# Patient Record
Sex: FEMALE | Race: WHITE | NOT HISPANIC OR LATINO | Employment: OTHER | URBAN - METROPOLITAN AREA
[De-identification: names, ages, dates, MRNs, and addresses within clinical notes are randomized per-mention and may not be internally consistent; named-entity substitution may affect disease eponyms.]

---

## 2024-03-11 DIAGNOSIS — Z96.641 AFTERCARE FOLLOWING RIGHT HIP JOINT REPLACEMENT SURGERY: ICD-10-CM

## 2024-03-11 DIAGNOSIS — Z47.1 AFTERCARE FOLLOWING RIGHT HIP JOINT REPLACEMENT SURGERY: ICD-10-CM

## 2024-03-11 RX ORDER — CHLORHEXIDINE GLUCONATE ORAL RINSE 1.2 MG/ML
15 SOLUTION DENTAL AS NEEDED
Qty: 120 ML | Refills: 0 | Status: SHIPPED | OUTPATIENT
Start: 2024-03-11 | End: 2024-03-25

## 2024-03-13 DIAGNOSIS — M16.11 PRIMARY OSTEOARTHRITIS OF RIGHT HIP: ICD-10-CM

## 2024-04-03 RX ORDER — HYDROCODONE BITARTRATE AND ACETAMINOPHEN 5; 325 MG/1; MG/1
1 TABLET ORAL 2 TIMES DAILY PRN
COMMUNITY
End: 2024-04-18

## 2024-04-03 RX ORDER — LEVOTHYROXINE SODIUM 200 UG/1
1 TABLET ORAL
COMMUNITY

## 2024-04-03 RX ORDER — NAPROXEN 250 MG/1
250 TABLET ORAL
COMMUNITY
End: 2024-04-18

## 2024-04-03 RX ORDER — VIT C/E/ZN/COPPR/LUTEIN/ZEAXAN 250MG-90MG
2 CAPSULE ORAL DAILY
COMMUNITY

## 2024-04-03 RX ORDER — BISMUTH SUBSALICYLATE 262 MG
1 TABLET,CHEWABLE ORAL DAILY
COMMUNITY

## 2024-04-03 RX ORDER — LOSARTAN POTASSIUM 50 MG/1
1 TABLET ORAL DAILY
COMMUNITY

## 2024-04-04 ENCOUNTER — TELEMEDICINE CLINICAL SUPPORT (OUTPATIENT)
Dept: PREADMISSION TESTING | Facility: HOSPITAL | Age: 69
End: 2024-04-04
Payer: COMMERCIAL

## 2024-04-04 DIAGNOSIS — M16.11 PRIMARY OSTEOARTHRITIS OF RIGHT HIP: ICD-10-CM

## 2024-04-16 NOTE — DISCHARGE INSTRUCTIONS
Jair Dill MD MS  [address] [facility]   [address]  [phone] (office)  [phone] (fax)    PLEASE READ CAREFULLY BEFORE CONTACTING YOUR PROVIDER.    WE WORK COLLABORATIVELY AS A TEAM. CALLING MULTIPLE STAFF MEMBERS REGARDING THE SAME ISSUE WILL DELAY YOUR CARE.  [facility] [employer] IS THE PREFERRED COMMUNICATION FOR ALL TEAM MEMBERS.    Postoperative Instructions: TOTAL HIP ARTHROPLASTY    JOINT CARE TEAM  Please use the information below to contact your care team following surgery.  If you are leaving a message, please include your full name, date of birth and date of surgery so that we can correctly identify you.  Your call will be returned within 1-2 business days, please do not leave multiple messages regarding a single issue while you are awaiting a return call.     Who to call Contact Information Matters needing handled   Jair Dill MD [facility]  [phone] Prescription Refills   Orders for dental antibiotics lifelong     Marla Ivey MBA, BSN, RN-BC  PREETHI Elizabeth, RN  Ortho Program Navigators - PREETHI Zapien, RN  Ortho Coordinator Wilian ALANIZN-BC  Ortho Nurse Navigator   [phone] Nursing, medical question related questions or concerns within 6 weeks of surgery   Orders for Outpatient Physical Therapy                    [phone]     Scheduling office Visits  Medical questions/concerns  Leave of Absence or other paperwork  Any concerns more than 6 weeks from surgery - an appointment will need to be made     MEDICATION REFILLS - (MyChart or Main Office)    You will not receive a call indicating that your prescription has been filled.  Please contact your pharmacy with any questions.    Medication refills will be filled Monday-Friday 7am to 1pm ONLY. Please call the office or send a [facility] message for a refill request.  Any requests received outside of this timeframe will be handled on the next  business day.  The office staff and orthopedic nurses cannot refill medications; messages should be left directly through the office or via my chart.  Please do not call multiple times or call other members of the care team for medication needs, this will cause the refill to take longer.    Per State and Institutional policy, pain medications can only be refilled every 7 days for up to six weeks following surgery.    DRIVING & TRAVEL AFTER SURGERY   Patients should anticipate waiting at least 4-6 weeks before traveling long distances after surgery.  You will need to stop to walk around ever 1 hour during your travel to help with blood clot prevention.  Please call the office or your joint nurse to discuss prior to post-surgical travel.  Patients may not drive until cleared by the joint nurse or the office.    DENTAL PROCEDURES & CLEANINGS  You must wait a minimum of 3 months for elective dental appointments, including routine cleanings or dental work including bridges, crowns, extractions, etc..  For any dental visit - cleaning or dental procedures - patients must take an antibiotic 1 hour before the appointment.  Antibiotics are a lifelong need before dental appointments.  The antibiotic prescribed will be based on each patient's allergies.    WOUND CARE  Pain and swelling are normal following surgery and can last for weeks to months depending on the patient.  To help relieve these symptoms, please follow the post-operative pain regimen as it has been prescribed, use ice often, wear compression stockings every day as prescribed, and elevate your leg every hour.   Leaving the hospital, you have an ACE wrap in place. Two days after surgery, you can remove the ACE wrap and discard it. It does NOT need to be reapplied again.  You have a waterproof bandage on your wound and may shower with this on. The waterproof bandage is to remain in place for a 7 days. You or your home therapist should remove it at this time. You  may leave your incision open to air after the bandage has been removed.  You may shower 48 hours after surgery.  Do not scrub directly over or near the edges of your surgical bandage.  Soap and water may run freely over the site.  Under your waterproof bandage you have Steri-strips or a mesh covering in place. DO NOT peel them off. They will fall off on their own. You can continue to shower with these on. Let the water run freely over your incision when showering and do not scrub the incision or the surrounding area.   When drying the area around the incision, please use a SEPARATE towel that was not used on the rest of your body. The towel should be recently laundered but does not have to be cleaned a specific way. It should be laundered every 3 days. Pat the area dry. Do not rub the area around the incision. Steri strips will fall off in 1-2 weeks. If after 2 weeks they, have not, gently peel them off.  You may have clear stitches at the ends of your wound. Place Band-Aids on them for the first few weeks to prevent rubbing. You can gently tug on them after 2 weeks and they will come out or fall out on their own. DO NOT cut them. If they have not fallen out by you post-op visit, your doctor will remove them  If you have black stitches in place, your doctor will remove them in 2 weeks. These CANNOT get wet. If you have the silver waterproof bandage in place, you can shower. If the waterproof bandage is removed or not sticking, you CANNOT shower.  DO NOT soak your incision in a bath, hot tub, pool or pond/lake for a minimum of 12 weeks following your surgery.  DO NOT use lotions, creams, ointments on your wound for a minimum of 6 weeks following your surgery. At that time you may use vitamin E to assist with softening of your incision. Your physical therapist or doctor will talk to you about scar massage which can be started at 6 weeks.   You do NOT need to use the soap that was provided to you prior to surgery after  surgery. Use regular soap or shampoo.     PAIN, SWELLING, BRUISING & CLICKING  Pain and swelling are a natural part of your recovery which is considered normal fur up to a year after surgery.  Symptoms may be treated with movement, ice, compression stockings, elevating your leg, and by following the pain medication regimen as prescribed.  Bruising is normal for several weeks after surgery and will run down the leg over time.  You may ice areas that are tender to help with discomfort.  You are required to wear the provided compression stockings, every day, for 4 weeks following surgery.  Remove the stockings at night and place them back on in the morning.  Pain and swelling may temporarily increase with an increase in activity or exercise.  Use ice after activity.  Audible clicking with movement or exercises is considered normal following joint replacement.  You may also feel decreased sensation or numbness near the incision site.  These are usually normal and may or may not fade over time.     PERSONAL HYGIENE  You may shower upon discharging from the hospital.  Soap and water is permitted to run over the surgical dressing, steri-strips and incision.  Do not scrub directly over these items.  DO NOT soak your incision in a bath, hot tub, pool or pond/lake for a minimum of 12 weeks following your surgery.  DO NOT use lotions, creams, ointments on your wound for a minimum of 6 weeks following your surgery. At that time you may use vitamin E to assist with softening of your incision.      RESTARTING HOME ROUTINE - DIET & MEDICATIONS  Post-operative constipation can result due to a combination of inactivity, anesthesia and pain medication. To help prevent this, you should increase your water and fiber intake. Physical activity such as walking will also help stimulate the bowels.   You may resume your normal diet when you discharge home.  Choose foods that help promote good bowel habits and prevent constipation, such as  foods high in fiber.  You may restart your home medications the following day after your surgery UNLESS you have been given alternate instructions.  Follow the instructions given to you on your hospital discharge instructions for more information regarding your home medications.      IN-HOME PHYSICAL THERAPY & OUTPATIENT PHYSICAL THERAPY  Remember to get up and walk around your home every hour to 90 minutes to prevent blood clots and help with your recovery. This is an ACTIVE recovery.  In-home physical therapy will start 1-2 days after you get home from the hospital.    The home care agency will call within the first 24-48 hours to set up their first visit.  Please do not call your care team to inquire during this timeframe.  Continue the exercises you were given in the hospital until you have been seen by in-home therapy.  Make sure to provide a phone number with the ability for the home care staff to leave a message if you do not answer your phone.    Outpatient physical therapy following hip replacement surgery should begin 2-3 weeks after surgery if you and your physical therapist feel that you need it.  You should call to schedule this appointment ASAP if not already scheduled before surgery.  Waiting until you are ready for outpatient physical therapy will cause a delay in your care.  You may choose any outpatient physical therapy location.  Call the office for an order if needed.    EMERGENCIES - WHEN TO CONTACT THE SURGEON'S OFFICE IMMEDIATELY  Fever >101 with chills that has been present for at least 48 hours.   Excessive bleeding from incision that will not slow down. A small amount of drainage is normal and expected.  Once pressure is applied and the area is covered, do not continue to check the area regularly.  This will remove pressure and bleeding will continue.  Leave in place for 4-6 hours.  Signs of infection of incision-excessive drainage that is soaking through your dressing (especially if it is  pus-like), redness that is spreading out from the edges of your incision, or increased warmth around the area.  Excruciating pain for which the pain medication, taken as instructed, is not helping.  Severe calf pain.  Go directly to the emergency room or call 911, if you are experiencing chest pain or difficulty breathing.    ICE & COLD THERAPY INSTRUCTIONS    To assist with pain control and post-op swelling, you should be using ice regularly throughout recovery, especially for the first 6 weeks, regardless of the cold therapy method you use.      Always make sure there is a layer of protection between the cold pad and your skin.    If you are using ICE PACKS or GEL PACKS, you will need to alternate 20 minutes on, 20 minutes off twice per hour.    If you are using an ICE MACHINE, please follow the provided ice machine instructions.  These devices differ from ice or ice packs whereas the mechanism circulates water through tubing and a pad to provide longer periods of cold therapy to the desired site.  You can use your cold devices around the clock for optimal comfort.  We recommend using cold therapy after working with therapy or completing exercises on your own.  There is no set schedule in which you must follow while using cold therapy.  Below are a few points to remember when using a cold therapy device:    You do not need to need to use the 20 on, 20 off method.  Detach the pad from the cooler and ambulate at least once every hour.  You can check your skin under the pad at this time.  You may wear the cold therapy device during periods of sleep including overnight.  If you wake up during the night, you can check the skin at this time.  You do not need to wake up specifically to perform skin checks.  Empty the cooler and pad when device is not in use.  Follow 's instructions for cleaning your cold therapy device.      DISCHARGE MEDICATIONS - Please reference the sample schedule for instructions on how  to best schedule medications.  PAIN MEDICATION    ___X_ Tramadol / Oxycodone  Tramadol and Oxycodone have been prescribed for post-operative pain control.    These medications will only be refilled ONCE every 7 days for a period of up to 6 weeks following surgery.  After 6 weeks, you will transition to acetaminophen and over -the- counter anti-inflammatories such as Ibuprofen, Advil or Aleve in conjunction with ICE/COLD THERAPY.   Side effects may be constipation and nausea, vomiting, sleepiness, dizziness, lightheadedness, headache, blurred vision, dry mouth sweating, itching (if you have itching, over-the -counter Benadryl can be used as needed).  You may NOT operate a motor vehicle while taking these medications or have been cleared by your care team.     ___X_ Acetaminophen (Tylenol)  Acetaminophen has been prescribed as an adjunct for pain control. Take two 500 mg tablets every 8 hours for 4 weeks. You will not receive a refill on this medication.  Do not exceed 4000mg of acetaminophen within a 24 hour period.  Side effects may include nausea, heartburn, drowsiness, and headache.    _______ Meloxicam (Mobic)-Meloxicam has been prescribed as an adjunct anti-inflammatory to assist in pain control.    Take one 15mg tablet once daily for 4 weeks.  You will not receive refills on this medication.   Side effects may include nausea.  May not be prescribed if you are on a more potent blood thinner than aspirin or have chronic kidney disease.    BLOOD THINNER    ___X_ Blood Thinner   Aspirin, eliquis or xarelto has been prescribed as a blood thinner to prevent blood clots in your leg or lungs. Take as prescribed on the bottle for 4 weeks. You will not receive a refill on this medication.  Do not take this medication if you are on another blood thinner.  Do not take any additional anti-inflammatory medications while taking Aspirin or another blood thinning medication.  Examples may include, Celebrex, Ibuprofen, Motrin,  Aleve, or Advil.     ANTI NAUSEA    ___X_ Pantoprazole (Protonix)  Pantoprazole has been prescribed to help with nausea and protect your stomach while taking pain medication. Take one 40 mg tablet once daily for 4 weeks. You will not receive a refill on this medication.    ___X_ Zofran (Ondansetron)  Zofran has been prescribed to help with nausea and protect your stomach while taking pain medication. Take one 4 mg tablet every eight hours as needed for nausea. Refills will be provided as necessary.      STOOL SOFTENERS    ___X_ Colace (Docusate Sodium) and Senna (Sennoside)  Post-operative constipation can result due to a combination of inactivity, anesthesia and pain medication. To help prevent this, you should increase your water and fiber intake. Physical activity such as walking will also help stimulate the bowels.   Colace has been prescribed to help with constipation while on Oxycodone and Tramadol. Take one 100 mg tablet twice daily for 4 weeks. No refills needed.  Senna has been prescribed in combination with Colace to help with constipation while taking your pain medications.  Take one 8.6mg Tablet once daily.      ANTIBIOTICS    ___X_ Cefadroxil or Doxycycline   Post-operative prevention of infection   Side effects can be upset stomach or diarrhea  Take with food. Do not take on an empty stomach  May not be prescribed     You will not receive refills on the following medications:  Acetaminophen (Tylenol)  Senna  Colace  Pantoprazole  Blood Thinner (Aspirin or Eliquis)  Antibiotic (Cefadroxil or Doxycycline)  Pain Medication Refills -[phone] or Annemarie- Monday through Friday 7am-1pm    Medication refills will be sent upon receipt of your request during the times listed above. Due to the high call volume, you will not receive a call confirming prescription refills; please do not call multiple times.  Prescription refills may take a few hours to process, you may follow up with your pharmacy for pickup  availability.    SAMPLE              The times below are an example of how to organize medications to optimize pain control  Your actual medication schedule may vary based on your last dose taken IN THE HOSPITAL      Time 3:00am 6:00am 9:00am 12:00pm 3:00pm 6:00pm 9:00pm 12:00am   Medications Tramadol Acetaminophen (Tylenol)   Oxycodone  Senna   Blood Thinner  Colace  Pantoprazole  Tramadol  Antibiotic  Oxycodone Tramadol  Acetaminophen (Tylenol) Oxycodone     Blood Thinner  Colace  Tramadol  Antibiotic   Acetaminophen (Tylenol)   Oxycodone            You may begin to wean off the pain medication as your pain remains controlled with increased activity.  The schedules provided are meant to serve as an example.  You may wean off based on your pain control.  Please note that pain medications are not filled beyond 6 weeks after surgery.              The times below are an example of how to WEAN OFF medications WHILE CONTINUING TO OPTIMIZE PAIN CONTROL.  Your actual medication schedule may vary based on your last dose taken.  Time 12:00am 4:00am 8:00am 12:00pm 4:00pm 8:00pm   Med Tramadol Oxycodone   Tramadol Oxycodone Tramadol Oxycodone     Time 12:00am 6:00am 12:00pm 6:00pm   Med Tramadol Oxycodone   Tramadol Oxycodone     Time 12:00am 8:00am 4:00pm   Med Tramadol Oxycodone   Tramadol     Time 12:00am 12:00pm   Med Tramadol Tramadol

## 2024-04-17 ENCOUNTER — HOSPITAL ENCOUNTER (OUTPATIENT)
Dept: RADIOLOGY | Facility: HOSPITAL | Age: 69
Discharge: HOME | End: 2024-04-17
Payer: COMMERCIAL

## 2024-04-17 ENCOUNTER — PRE-ADMISSION TESTING (OUTPATIENT)
Dept: PREADMISSION TESTING | Facility: HOSPITAL | Age: 69
End: 2024-04-17
Payer: COMMERCIAL

## 2024-04-17 ENCOUNTER — OFFICE VISIT (OUTPATIENT)
Dept: ORTHOPEDIC SURGERY | Facility: CLINIC | Age: 69
End: 2024-04-17
Payer: COMMERCIAL

## 2024-04-17 VITALS
HEIGHT: 66 IN | OXYGEN SATURATION: 96 % | SYSTOLIC BLOOD PRESSURE: 116 MMHG | DIASTOLIC BLOOD PRESSURE: 63 MMHG | TEMPERATURE: 97.3 F | BODY MASS INDEX: 36.85 KG/M2 | RESPIRATION RATE: 18 BRPM | HEART RATE: 89 BPM | WEIGHT: 229.28 LBS

## 2024-04-17 DIAGNOSIS — R52 PAIN: Primary | ICD-10-CM

## 2024-04-17 DIAGNOSIS — M16.11 PRIMARY OSTEOARTHRITIS OF RIGHT HIP: Primary | ICD-10-CM

## 2024-04-17 DIAGNOSIS — R52 PAIN: ICD-10-CM

## 2024-04-17 PROCEDURE — 73502 X-RAY EXAM HIP UNI 2-3 VIEWS: CPT | Mod: RIGHT SIDE | Performed by: RADIOLOGY

## 2024-04-17 PROCEDURE — 99204 OFFICE O/P NEW MOD 45 MIN: CPT | Performed by: NURSE PRACTITIONER

## 2024-04-17 PROCEDURE — 1036F TOBACCO NON-USER: CPT | Performed by: STUDENT IN AN ORGANIZED HEALTH CARE EDUCATION/TRAINING PROGRAM

## 2024-04-17 PROCEDURE — 99214 OFFICE O/P EST MOD 30 MIN: CPT | Performed by: STUDENT IN AN ORGANIZED HEALTH CARE EDUCATION/TRAINING PROGRAM

## 2024-04-17 PROCEDURE — 73502 X-RAY EXAM HIP UNI 2-3 VIEWS: CPT | Mod: RT

## 2024-04-17 PROCEDURE — 1159F MED LIST DOCD IN RCRD: CPT | Performed by: STUDENT IN AN ORGANIZED HEALTH CARE EDUCATION/TRAINING PROGRAM

## 2024-04-17 PROCEDURE — 1160F RVW MEDS BY RX/DR IN RCRD: CPT | Performed by: STUDENT IN AN ORGANIZED HEALTH CARE EDUCATION/TRAINING PROGRAM

## 2024-04-17 RX ORDER — CEFADROXIL 500 MG/1
500 CAPSULE ORAL 2 TIMES DAILY
Qty: 10 CAPSULE | Refills: 0 | Status: SHIPPED | OUTPATIENT
Start: 2024-04-17 | End: 2024-04-24

## 2024-04-17 RX ORDER — ONDANSETRON 4 MG/1
4 TABLET, FILM COATED ORAL EVERY 8 HOURS PRN
Qty: 20 TABLET | Refills: 0 | Status: SHIPPED | OUTPATIENT
Start: 2024-04-17

## 2024-04-17 RX ORDER — TRAMADOL HYDROCHLORIDE 50 MG/1
50-100 TABLET ORAL EVERY 6 HOURS PRN
Qty: 40 TABLET | Refills: 0 | Status: SHIPPED | OUTPATIENT
Start: 2024-04-17 | End: 2024-04-22 | Stop reason: SDUPTHER

## 2024-04-17 RX ORDER — PANTOPRAZOLE SODIUM 40 MG/1
40 TABLET, DELAYED RELEASE ORAL
Qty: 30 TABLET | Refills: 0 | Status: SHIPPED | OUTPATIENT
Start: 2024-04-17 | End: 2024-05-19

## 2024-04-17 RX ORDER — OXYCODONE HYDROCHLORIDE 5 MG/1
5-10 TABLET ORAL EVERY 6 HOURS PRN
Qty: 40 TABLET | Refills: 0 | Status: SHIPPED | OUTPATIENT
Start: 2024-04-17 | End: 2024-04-22 | Stop reason: SDUPTHER

## 2024-04-17 RX ORDER — DOCUSATE SODIUM 100 MG/1
100 CAPSULE, LIQUID FILLED ORAL 2 TIMES DAILY
Qty: 30 CAPSULE | Refills: 0 | Status: SHIPPED | OUTPATIENT
Start: 2024-04-17 | End: 2024-05-04

## 2024-04-17 RX ORDER — SENNOSIDES 8.6 MG/1
1 TABLET ORAL DAILY
Qty: 15 TABLET | Refills: 0 | Status: SHIPPED | OUTPATIENT
Start: 2024-04-17 | End: 2024-05-04

## 2024-04-17 RX ORDER — NAPROXEN SODIUM 220 MG/1
81 TABLET, FILM COATED ORAL 2 TIMES DAILY
Qty: 60 TABLET | Refills: 0 | Status: SHIPPED | OUTPATIENT
Start: 2024-04-17 | End: 2024-05-19

## 2024-04-17 RX ORDER — ACETAMINOPHEN 500 MG
1000 TABLET ORAL EVERY 8 HOURS
Qty: 60 TABLET | Refills: 1 | Status: SHIPPED | OUTPATIENT
Start: 2024-04-17 | End: 2024-05-07

## 2024-04-17 ASSESSMENT — ENCOUNTER SYMPTOMS
GASTROINTESTINAL NEGATIVE: 1
ARTHRALGIAS: 1
ENDOCRINE NEGATIVE: 1
CONSTITUTIONAL NEGATIVE: 1
NECK NEGATIVE: 1
RESPIRATORY NEGATIVE: 1
NEUROLOGICAL NEGATIVE: 1

## 2024-04-17 NOTE — H&P (VIEW-ONLY)
PRIMARY CARE PHYSICIAN: No primary care provider on file.  REFERRING PROVIDER: No referring provider defined for this encounter.       SUBJECTIVE  CHIEF COMPLAINT: No chief complaint on file.       HPI: Lisa Brewster is a pleasant 68 y.o. year-old female who is seen today for evaluation of right hip pain.  Patient presents as part of our travel program.  Patient was indicated for right total hip arthroplasty by her local orthopedic surgeon as seen today for surgical consultation.    Pain has been severe since January.  She has lost range of motion.  She finds it difficult to perform certain activities of daily living.  She has been unable to work for at least 1 week.  She has tried to control her pain with activity modification, over-the-counter medications and prescription opioid medications.  She states that she usually takes tramadol but that tramadol has not been providing her with much relief.  Recently, she has had to use Vicodin.    Hx of L ANNA with Dr. Garcia in 2021: 54 [place] cup, 6 summit stem (std offset), +5 ceramic head, 36 neutral liner     Patient denies any history of delayed healing or postoperative infection following any of her joint placement surgeries.    REVIEW OF SYSTEMS  The patient denies any fever, chills, chest pain, shortness of breath or difficulty breathing.  Patient denies any numbness, tingling, or radicular symptoms.  Adult patient history sheet was filled out by the patient today in clinic and will be scanned into the EMR.  I personally reviewed this form which will be scanned into the EMR.  This includes Past Medical History, Past Surgical History, Medications, Allergies, Social History, Family History and 12 point review of systems.    Past Medical History:   Diagnosis Date    Awareness under anesthesia     knee surgery    Chronic low back pain     s/p injections    Delayed emergence from general anesthesia     during toe surgery    Hypertension     Hyponatremia     Na 134 -  3/18/24    Hypothyroidism     s/p thyroidectomy d/t hyperthyroidism - benign    Obesity (BMI 30-39.9)     3/18/24: BMI 39.05    Osteoarthritis     Venous [place]         Allergies   Allergen Reactions    Adhesive Tape-Silicones Rash    Latex Swelling and Rash        Past Surgical History:   Procedure Laterality Date     SECTION, LOW TRANSVERSE      x2    FOOT SURGERY Bilateral     triple arthrodesis    PLANTAR FASCIA RELEASE Left     TOE SURGERY Right     TOTAL HIP ARTHROPLASTY Left     TOTAL KNEE ARTHROPLASTY Left     TOTAL KNEE ARTHROPLASTY Right     TOTAL THYROIDECTOMY          Family History   Problem Relation Name Age of Onset    Scleroderma Mother      Arthritis Brother          Social History     Socioeconomic History    Marital status:      Spouse name: Not on file    Number of children: Not on file    Years of education: Not on file    Highest education level: Not on file   Occupational History    Not on file   Tobacco Use    Smoking status: Never    Smokeless tobacco: Never   Substance and Sexual Activity    Alcohol use: Yes     Comment: once/month    Drug use: Never    Sexual activity: Not on file   Other Topics Concern    Not on file   Social History Narrative    Not on file     Social Determinants of Health     Financial Resource Strain: Not on file   Food Insecurity: Not on file   Transportation Needs: Not on file   Physical Activity: Not on file   Stress: Not on file   Social Connections: Not on file   Intimate Partner Violence: Not on file   Housing Stability: Not on file        CURRENT MEDICATIONS:   Current Outpatient Medications   Medication Sig Dispense Refill    cholecalciferol (Vitamin D3) 25 MCG (1000 UT) capsule Take 2 capsules (50 mcg) by mouth once daily.      HYDROcodone-acetaminophen (Norco) 5-325 mg tablet Take 1 tablet by mouth 2 times a day as needed for severe pain (7 - 10).      levothyroxine (Synthroid, Levoxyl) 200 mcg tablet Take 1 tablet (200 mcg) by mouth once daily  in the morning. Take before meals.      losartan (Cozaar) 50 mg tablet Take 1 tablet (50 mg) by mouth once daily.      multivitamin tablet Take 1 tablet by mouth once daily.      naproxen (Naprosyn) 250 mg tablet Take 1 tablet (250 mg) by mouth 2 times a day with meals.       No current facility-administered medications for this visit.        OBJECTIVE    PHYSICAL EXAM  There is no height or weight on file to calculate BMI.    General: Well-appearing female in no acute distress.  Awake, alert and oriented.  Pleasant and cooperative.  Respiratory: Non-labored breathing  Mood: Euthymic   Gait: Antalgic  Assistive Device: None     Limb Length Discrepancy: 5mm     Affected Right Hip  Range of motion:   Flexion: 90  Extension: 0  Internal Rotation: 0  External Rotation: 15  Abduction: 10  Hip Flexor Strength: 5/5  Abductor Strength: 5/5  Adductor Strength: 5/5  Tenderness: None  Sensation: Intact to light touch distally  Motor function: Able to fire TA, EHL, G/S  Pulses: Palpable DP pulse    Unaffected Left Hip  Range of motion:   Flexion: 120  Extension: 0  Internal Rotation: 20  External Rotation: 40  Abduction: 35  Hip Flexor Strength: 5/5  Abductor Strength: 5/5  Adductor Strength: 5/5  Tenderness: None  Sensation: Intact to light touch distally  Motor function: Able to fire TA, EHL, G/S    IMAGING:  AP pelvis, AP hip and false profile views: Independent review of right hip and pelvis x-rays was performed. The findings were reviewed with the patient. There are severe degenerative changes of the right hip with associated joint space narrowing, subchondral sclerosis, and osteophyte formation. No evidence of fracture, AVN, dislocation, osteomyelitis.      ASSESSMENT & PLAN    IMPRESSION:  Lisa Brewster for more than six months has had limited function as well as persistent and severe pain which has negatively impacted the quality of life and interfered with activities of daily living. Under my care or the care of other  providers, for greater than the three months, conservative treatment including activity modification, over the counter pain medications, physical therapy and/or recommended home exercise program, have provided only minimal relief. The patient has not had an intra-articular injection in the past 3 months. The option to continue with conservative measures in lieu of arthroplasty was discussed and offered. However, given the failure of these conservative measures and the clinical and radiographic evidence of end-stage arthritis, the patient is a good candidate for an elective total hip arthroplasty. The stated potential benefits include pain relief, a feeling of improved joint motion and improved function were discussed but no guarantees were offered.      PLAN:  I talked with the patient at length about risks, limitations, benefits and alternatives to total hip replacement today. I reviewed risks and concerns including but not limited to implant wear, loosening, implant failure, infection, need for revision surgery, delayed wound healing, deep vein thrombosis, pulmonary embolism, stroke, other cardiopulmonary event, nerve or vascular injury, death and other medical and anesthetic complications of surgery. We talked about the potential for persistent pain following surgery since there are many possible causes for hip and leg pain. The patient was advised that hip replacement will only relieve pain that is coming from the hip. We talked about leg length discrepancy that may necessitate the use of a shoe lift, neurovascular problems such as foot drop and dislocation after surgery. The patient understands that we may have to lengthen the leg slightly to provide for adequate stability of the hip. I reviewed dislocation precautions and activity restrictions in detail. We discussed the concerns about intraoperative fracture, ingrowth failure, thigh pain and possible post-operative weight bearing restrictions following  cementless hip replacement.  We discussed the possible need for a blood transfusion. We discussed the fact that many of our patients are able to go home in 1 day or the same day depending on their health, mobility, pre-op preparation, individual home situation and personal preference. The patient should take our pre-operative teaching class. All of the patients questions were answered. The patient can call my office to schedule surgery and the pre-op teaching class. I told the patient that they should contact their primary care physician to discuss fitness for surgery. The patient was also encouraged to get dental clearance prior to surgery.     The patient acknowledged a clear understanding of these issues and expressed the desire to proceed with surgery once medical clearance has been obtained.    The patient has identified their personal goals of their joint replacement surgery and recovery and we have discussed them. These are documented in our [employer] patient engagement platform. In addition, we have discussed the advantages and disadvantages of various implant and fixation options, as well as various surgical approaches. The basic concepts of the joint replacement procedure has been reviewed with the patient and the patient has been provided the opportunity to see an actual implant either in the office or in our pre-op education class.    I also discussed with the patient the risks of being in the hospital environment in the setting of COVID-19. This risk was considered in light of the overall risk and benefit discussion related to the surgery. The patient's symptoms are severe and worsening, and cause an inability to perform activities of daily living. All possible precautions will be taken and length of stay will be limited as much as possible. The patient is fully aware of this after complete discussion and would like to proceed.    The patient has the following comorbidities that increase the risk of  infection following joint replacement surgery: obesity, peripheral edema. This was explicitly discussed with the patient and they would like to proceed with surgery.     Surgical plan: Right total hip arthroplasty   Implants: Depuy [place] and Actis   Special equipment: None  DVT prophylaxis:  Aspirin 81 mg BID for 4 weeks   Drugs to stop: none  Allergies to antibiotics: none  Antibiotic Plan: Ancef (+/- vancomycin pending MRSA screen)  Special clearance needed: NA  Pain medication post op: Standard: Oxycodone, Tramadol and Tylenol   DME Recommendations: Hip Kit, Raised Toilet Seat if toilet seats at home are low,  Walker or Crutches depending on patient´s preference, Thigh high compression stocking. OPTIONAL: [employer]     * This office note was dictated using Dragon voice to text software and was not proofread for spelling or grammatical errors *

## 2024-04-17 NOTE — PREPROCEDURE INSTRUCTIONS
Medication List            Accurate as of April 17, 2024  9:59 AM. Always use your most recent med list.                HYDROcodone-acetaminophen 5-325 mg tablet  Commonly known as: Norco  Medication Adjustments for Surgery: Take morning of surgery with sip of water, no other fluids     levothyroxine 200 mcg tablet  Commonly known as: Synthroid, Levoxyl  Medication Adjustments for Surgery: Take morning of surgery with sip of water, no other fluids     losartan 50 mg tablet  Commonly known as: Cozaar  Medication Adjustments for Surgery: Continue until night before surgery     multivitamin tablet  Medication Adjustments for Surgery: Other (Comment)  Notes to patient: STOP NOW     naproxen 250 mg tablet  Commonly known as: Naprosyn  Medication Adjustments for Surgery: Other (Comment)  Notes to patient: STOP NOW     Vitamin D3 25 MCG (1000 UT) capsule  Generic drug: cholecalciferol  Medication Adjustments for Surgery: Continue until night before surgery                          **Concerning above medication instructions, if medication is normally taken at night, continue normal schedule.**  **DO NOT TAKE NIGHT PRIOR AND MORNING OF SURGERY**    CONTACT SURGEON'S OFFICE IF YOU DEVELOP:  * Fever = 100.4 F   * New respiratory symptoms (e.g. cough, shortness of breath, respiratory distress, sore throat)  * Recent loss of taste or smell  *Flu like symptoms such as headache, fatigue or gastrointestinal symptoms  * You develop any open sores, shingles, burning or painful urination   AND/OR:  * You no longer wish to have the surgery.  * Any other personal circumstances change that may lead to the need to cancel or defer this surgery.  *You were admitted to any hospital within one week of your planned procedure.    SMOKING:  *Quitting smoking can make a huge difference to your health and recovery from surgery.    *If you need help with quitting, call 1-581-QUIT-NOW.    THE DAY BEFORE SURGERY:  *Do not eat any food after  midnight the night before surgery.   *You are permitted to drink clear liquids (i.e. water, black coffee (no milk or cream), tea, apple juice and electrolyte drinks (gatorade)) up to 10 ounces, up to 2 hours before your arrival time.  *You may chew gum until 2 hours before your surgery    SURGICAL TIME  *You will be contacted between 2 p.m. and 6 p.m. the business day before your surgery with your arrival time.  *If you haven't received a call by 6pm, call [phone].  *Scheduled surgery times may change and you will be notified if this occurs-check your personal voicemail for any updates.    ON THE MORNING OF SURGERY:  *Wear comfortable, loose fitting clothing.   *Do not use moisturizers, creams, lotions or perfume.  *All jewelry and valuables should be left at home.  *Prosthetic devices such as contact lenses, hearing aids, dentures, eyelash extensions, hairpins and body piercing must be removed before surgery.    BRING WITH YOU:  *Photo ID and insurance card  *Current list of medicines and allergies  *Pacemaker/Defibrillator/Heart stent cards  *CPAP machine and mask  *Slings/splints/crutches  *Copy of your complete Advanced Directive/DHPOA-if applicable  *Neurostimulator implant remote    PARKING AND ARRIVAL:  *Check in at the Main Entrance desk and let them know you are here for surgery.  *You will be directed to the 2nd floor surgical waiting area.    AFTER OUTPATIENT SURGERY:  *A responsible adult MUST accompany you at the time of discharge and stay with you for 24 hours after your surgery.  *You may NOT drive yourself home after surgery.  *You may use a taxi or ride sharing service ("[employer]) to return home ONLY if you are accompanied by a friend or family member.  *Instructions for resuming your medications will be provided by your surgeon.

## 2024-04-17 NOTE — CPM/PAT H&P
[facility]/PAT Evaluation       Name: Lisa Brewster (Lisa Brewster)  /Age: 1955/68 y.o.     In-Person           HPI        Date of Consult: 24    Referring Provider: Dr. Dill    Surgery, Date, and Length: Right total hip arthroplasty, 24 DEXTER    Lisa Brewster is a 68 year-old female who presents to the [facility] for perioperative risk assessment prior to surgery.    Patient presents with a primary diagnosis of right hip pain. Pain has been severe since January.  She has lost range of motion.  She finds it difficult to perform certain activities of daily living.  She has been unable to work for at least 1 week.  She has tried to control her pain with activity modification, over-the-counter medications and prescription opioid medications.  She states that she usually takes tramadol but that tramadol has not been providing her with much relief.  Recently, she has had to use Vicodin. Hx of L ANNA with Dr. Sagastume in : 54 [place] cup, 6 summit stem (std offset), +5 ceramic head, 36 neutral liner     This note was created in part upon personal review of patient's medical records.      Patient is scheduled to have Right total hip arthroplasty,      Pt denies any past history of anesthetic complications such as PONV, awareness, prolonged sedation, dental damage, aspiration, cardiac arrest, difficult intubation, difficult I.V. access or unexpected hospital admissions.  NO malignant hyperthermia and or pseudocholinesterase deficiency.  No history of blood transfusions     AWARENESS UNDER ANESTHESIA DURING DR. SAGASTUME SURGERY    STOP BANG 2    The patient is not a [employer] and will accept blood and blood products if medically indicated.   Type and screen not sent.     Past Medical History:   Diagnosis Date    Awareness under anesthesia     knee surgery    Chronic low back pain     s/p injections    Delayed emergence from general anesthesia     during toe surgery    Hypertension     Hyponatremia      "Na 134 - 3/18/24    Hypothyroidism     s/p thyroidectomy d/t hyperthyroidism - benign    Obesity (BMI 30-39.9)     3/18/24: BMI 39.05    Osteoarthritis     Venous [place]        Past Surgical History:   Procedure Laterality Date     SECTION, LOW TRANSVERSE      x2    FOOT SURGERY Bilateral     triple arthrodesis    PLANTAR FASCIA RELEASE Left     TOE SURGERY Right     TOTAL HIP ARTHROPLASTY Left     TOTAL KNEE ARTHROPLASTY Left     TOTAL KNEE ARTHROPLASTY Right     TOTAL THYROIDECTOMY         Social History     Socioeconomic History    Marital status:      Spouse name: Not on file    Number of children: Not on file    Years of education: Not on file    Highest education level: Not on file   Occupational History    Not on file   Tobacco Use    Smoking status: Never    Smokeless tobacco: Never   Substance and Sexual Activity    Alcohol use: Yes     Comment: once/month    Drug use: Never    Sexual activity: Not on file   Other Topics Concern    Not on file   Social History Narrative    Not on file     Social Determinants of Health     Financial Resource Strain: Not on file   Food Insecurity: Not on file   Transportation Needs: Not on file   Physical Activity: Not on file   Stress: Not on file   Social Connections: Not on file   Intimate Partner Violence: Not on file   Housing Stability: Not on file        Family History   Problem Relation Name Age of Onset    Scleroderma Mother      Arthritis Brother         Allergies   Allergen Reactions    Adhesive Tape-Silicones Rash    Latex Swelling and Rash     Heart Rate:  [89]   Temp:  [36.3 °C (97.3 °F)]   Resp:  [18]   BP: (116)/(63)   Height:  [166.4 cm (5' 5.5\")]   Weight:  [104 kg (229 lb 4.5 oz)]   SpO2:  [96 %]         PAT ROS:   Constitutional:   neg    Neuro/Psych:   neg    Eyes:    WEARS GLASSES  Ears:   neg    Nose:   neg    Mouth:   neg    Throat:   neg    Neck:   neg    Cardio:    FUNCTIONAL 4 METS. DENIES SOB WALKING FROM PAT TO PARKING LOT. WORKS AT " [employer] 10-12 HOURS A DAY  Respiratory:   neg    Endocrine:   neg    GI:   neg    :   neg    Musculoskeletal:    arthralgias (GENERAL)  Hematologic:   neg    Skin:  neg        Physical Exam  Vitals reviewed. Physical exam within normal limits.  (USE WALKER PRN)         PAT AIRWAY:   Airway:     Mallampati::  III    Neck ROM::  Full  normal        SEE ATTACHED DOCUMENTS FOR EKG, CBC, BMP, HGBA1C AND MRSA RESULTS      Assessment and Plan:         Patient is a 68-year-old female scheduled for a with Dr. Dill on 4/18/24 .  Patient has no active cardiac symptoms.   Patient denies any chest pain, tightness, heaviness, pressure, radiating pain, palpitations, irregular heartbeats, lightheadedness, cough, congestion, shortness of breath, HOOK, PND, near syncope, weight loss or gain.     RCRI  1  , 6 % Risk of MACE    Cardiology:  -- History of HTN: instruct to hold losartan on DOS    Hematology:  Patient instructed to ambulate as soon as possible postoperatively to decrease thromboembolic risk.   Initiate mechanical DVT prophylaxis as soon as possible and initiate chemical prophylaxis when deemed safe from a bleeding standpoint post surgery.       Caprini: 7    Renal:  -- Recommendations to avoid nephrotoxic drugs and carefully monitor fluid status to maintain euvolemia. Use dose adjusted medications as needed for the underlying level of renal function.      Risk assessment complete.  Patient is scheduled for a low surgical risk procedure.        Preoperative medication instructions were provided and reviewed with the patient.  Any additional testing or evaluation was explained to the patient.  Nothing by mouth instructions were discussed and patient's questions were answered prior to conclusion to this encounter.  Patient verbalized understanding of preoperative instructions given in preadmission testing; discharge instructions available in EMR.    This note was dictated by a speech recognition.  Minor errors may  have been detected in a speech recognition.

## 2024-04-17 NOTE — PROGRESS NOTES
PRIMARY CARE PHYSICIAN: No primary care provider on file.  REFERRING PROVIDER: No referring provider defined for this encounter.       SUBJECTIVE  CHIEF COMPLAINT: No chief complaint on file.       HPI: Lisa Brewster is a pleasant 68 y.o. year-old female who is seen today for evaluation of right hip pain.  Patient presents as part of our travel program.  Patient was indicated for right total hip arthroplasty by her local orthopedic surgeon as seen today for surgical consultation.    Pain has been severe since January.  She has lost range of motion.  She finds it difficult to perform certain activities of daily living.  She has been unable to work for at least 1 week.  She has tried to control her pain with activity modification, over-the-counter medications and prescription opioid medications.  She states that she usually takes tramadol but that tramadol has not been providing her with much relief.  Recently, she has had to use Vicodin.    Hx of L ANNA with Dr. Garcia in 2021: 54 Lamoni cup, 6 summit stem (std offset), +5 ceramic head, 36 neutral liner     Patient denies any history of delayed healing or postoperative infection following any of her joint placement surgeries.    REVIEW OF SYSTEMS  The patient denies any fever, chills, chest pain, shortness of breath or difficulty breathing.  Patient denies any numbness, tingling, or radicular symptoms.  Adult patient history sheet was filled out by the patient today in clinic and will be scanned into the EMR.  I personally reviewed this form which will be scanned into the EMR.  This includes Past Medical History, Past Surgical History, Medications, Allergies, Social History, Family History and 12 point review of systems.    Past Medical History:   Diagnosis Date    Awareness under anesthesia     knee surgery    Chronic low back pain     s/p injections    Delayed emergence from general anesthesia     during toe surgery    Hypertension     Hyponatremia     Na 134 -  3/18/24    Hypothyroidism     s/p thyroidectomy d/t hyperthyroidism - benign    Obesity (BMI 30-39.9)     3/18/24: BMI 39.05    Osteoarthritis     Venous [place]         Allergies   Allergen Reactions    Adhesive Tape-Silicones Rash    Latex Swelling and Rash        Past Surgical History:   Procedure Laterality Date     SECTION, LOW TRANSVERSE      x2    FOOT SURGERY Bilateral     triple arthrodesis    PLANTAR FASCIA RELEASE Left     TOE SURGERY Right     TOTAL HIP ARTHROPLASTY Left     TOTAL KNEE ARTHROPLASTY Left     TOTAL KNEE ARTHROPLASTY Right     TOTAL THYROIDECTOMY          Family History   Problem Relation Name Age of Onset    Scleroderma Mother      Arthritis Brother          Social History     Socioeconomic History    Marital status:      Spouse name: Not on file    Number of children: Not on file    Years of education: Not on file    Highest education level: Not on file   Occupational History    Not on file   Tobacco Use    Smoking status: Never    Smokeless tobacco: Never   Substance and Sexual Activity    Alcohol use: Yes     Comment: once/month    Drug use: Never    Sexual activity: Not on file   Other Topics Concern    Not on file   Social History Narrative    Not on file     Social Determinants of Health     Financial Resource Strain: Not on file   Food Insecurity: Not on file   Transportation Needs: Not on file   Physical Activity: Not on file   Stress: Not on file   Social Connections: Not on file   Intimate Partner Violence: Not on file   Housing Stability: Not on file        CURRENT MEDICATIONS:   Current Outpatient Medications   Medication Sig Dispense Refill    cholecalciferol (Vitamin D3) 25 MCG (1000 UT) capsule Take 2 capsules (50 mcg) by mouth once daily.      HYDROcodone-acetaminophen (Norco) 5-325 mg tablet Take 1 tablet by mouth 2 times a day as needed for severe pain (7 - 10).      levothyroxine (Synthroid, Levoxyl) 200 mcg tablet Take 1 tablet (200 mcg) by mouth once daily  in the morning. Take before meals.      losartan (Cozaar) 50 mg tablet Take 1 tablet (50 mg) by mouth once daily.      multivitamin tablet Take 1 tablet by mouth once daily.      naproxen (Naprosyn) 250 mg tablet Take 1 tablet (250 mg) by mouth 2 times a day with meals.       No current facility-administered medications for this visit.        OBJECTIVE    PHYSICAL EXAM  There is no height or weight on file to calculate BMI.    General: Well-appearing female in no acute distress.  Awake, alert and oriented.  Pleasant and cooperative.  Respiratory: Non-labored breathing  Mood: Euthymic   Gait: Antalgic  Assistive Device: None     Limb Length Discrepancy: 5mm     Affected Right Hip  Range of motion:   Flexion: 90  Extension: 0  Internal Rotation: 0  External Rotation: 15  Abduction: 10  Hip Flexor Strength: 5/5  Abductor Strength: 5/5  Adductor Strength: 5/5  Tenderness: None  Sensation: Intact to light touch distally  Motor function: Able to fire TA, EHL, G/S  Pulses: Palpable DP pulse    Unaffected Left Hip  Range of motion:   Flexion: 120  Extension: 0  Internal Rotation: 20  External Rotation: 40  Abduction: 35  Hip Flexor Strength: 5/5  Abductor Strength: 5/5  Adductor Strength: 5/5  Tenderness: None  Sensation: Intact to light touch distally  Motor function: Able to fire TA, EHL, G/S    IMAGING:  AP pelvis, AP hip and false profile views: Independent review of right hip and pelvis x-rays was performed. The findings were reviewed with the patient. There are severe degenerative changes of the right hip with associated joint space narrowing, subchondral sclerosis, and osteophyte formation. No evidence of fracture, AVN, dislocation, osteomyelitis.      ASSESSMENT & PLAN    IMPRESSION:  Lisa Brewster for more than six months has had limited function as well as persistent and severe pain which has negatively impacted the quality of life and interfered with activities of daily living. Under my care or the care of other  providers, for greater than the three months, conservative treatment including activity modification, over the counter pain medications, physical therapy and/or recommended home exercise program, have provided only minimal relief. The patient has not had an intra-articular injection in the past 3 months. The option to continue with conservative measures in lieu of arthroplasty was discussed and offered. However, given the failure of these conservative measures and the clinical and radiographic evidence of end-stage arthritis, the patient is a good candidate for an elective total hip arthroplasty. The stated potential benefits include pain relief, a feeling of improved joint motion and improved function were discussed but no guarantees were offered.      PLAN:  I talked with the patient at length about risks, limitations, benefits and alternatives to total hip replacement today. I reviewed risks and concerns including but not limited to implant wear, loosening, implant failure, infection, need for revision surgery, delayed wound healing, deep vein thrombosis, pulmonary embolism, stroke, other cardiopulmonary event, nerve or vascular injury, death and other medical and anesthetic complications of surgery. We talked about the potential for persistent pain following surgery since there are many possible causes for hip and leg pain. The patient was advised that hip replacement will only relieve pain that is coming from the hip. We talked about leg length discrepancy that may necessitate the use of a shoe lift, neurovascular problems such as foot drop and dislocation after surgery. The patient understands that we may have to lengthen the leg slightly to provide for adequate stability of the hip. I reviewed dislocation precautions and activity restrictions in detail. We discussed the concerns about intraoperative fracture, ingrowth failure, thigh pain and possible post-operative weight bearing restrictions following  cementless hip replacement.  We discussed the possible need for a blood transfusion. We discussed the fact that many of our patients are able to go home in 1 day or the same day depending on their health, mobility, pre-op preparation, individual home situation and personal preference. The patient should take our pre-operative teaching class. All of the patients questions were answered. The patient can call my office to schedule surgery and the pre-op teaching class. I told the patient that they should contact their primary care physician to discuss fitness for surgery. The patient was also encouraged to get dental clearance prior to surgery.     The patient acknowledged a clear understanding of these issues and expressed the desire to proceed with surgery once medical clearance has been obtained.    The patient has identified their personal goals of their joint replacement surgery and recovery and we have discussed them. These are documented in our [employer] patient engagement platform. In addition, we have discussed the advantages and disadvantages of various implant and fixation options, as well as various surgical approaches. The basic concepts of the joint replacement procedure has been reviewed with the patient and the patient has been provided the opportunity to see an actual implant either in the office or in our pre-op education class.    I also discussed with the patient the risks of being in the hospital environment in the setting of COVID-19. This risk was considered in light of the overall risk and benefit discussion related to the surgery. The patient's symptoms are severe and worsening, and cause an inability to perform activities of daily living. All possible precautions will be taken and length of stay will be limited as much as possible. The patient is fully aware of this after complete discussion and would like to proceed.    The patient has the following comorbidities that increase the risk of  infection following joint replacement surgery: obesity, peripheral edema. This was explicitly discussed with the patient and they would like to proceed with surgery.     Surgical plan: Right total hip arthroplasty   Implants: Depuy Causey and Actis   Special equipment: None  DVT prophylaxis:  Aspirin 81 mg BID for 4 weeks   Drugs to stop: none  Allergies to antibiotics: none  Antibiotic Plan: Ancef (+/- vancomycin pending MRSA screen)  Special clearance needed: NA  Pain medication post op: Standard: Oxycodone, Tramadol and Tylenol   DME Recommendations: Hip Kit, Raised Toilet Seat if toilet seats at home are low,  Walker or Crutches depending on patient´s preference, Thigh high compression stocking. OPTIONAL: [employer]     * This office note was dictated using Dragon voice to text software and was not proofread for spelling or grammatical errors *

## 2024-04-18 ENCOUNTER — APPOINTMENT (OUTPATIENT)
Dept: RADIOLOGY | Facility: HOSPITAL | Age: 69
End: 2024-04-18
Payer: COMMERCIAL

## 2024-04-18 ENCOUNTER — ANESTHESIA (OUTPATIENT)
Dept: OPERATING ROOM | Facility: HOSPITAL | Age: 69
End: 2024-04-18
Payer: COMMERCIAL

## 2024-04-18 ENCOUNTER — HOME HEALTH ADMISSION (OUTPATIENT)
Dept: HOME HEALTH SERVICES | Facility: HOME HEALTH | Age: 69
End: 2024-04-18
Payer: COMMERCIAL

## 2024-04-18 ENCOUNTER — HOSPITAL ENCOUNTER (OUTPATIENT)
Facility: HOSPITAL | Age: 69
Discharge: HOME | End: 2024-04-19
Attending: STUDENT IN AN ORGANIZED HEALTH CARE EDUCATION/TRAINING PROGRAM | Admitting: STUDENT IN AN ORGANIZED HEALTH CARE EDUCATION/TRAINING PROGRAM
Payer: COMMERCIAL

## 2024-04-18 ENCOUNTER — ANESTHESIA EVENT (OUTPATIENT)
Dept: OPERATING ROOM | Facility: HOSPITAL | Age: 69
End: 2024-04-18
Payer: COMMERCIAL

## 2024-04-18 DIAGNOSIS — M16.11 PRIMARY OSTEOARTHRITIS OF RIGHT HIP: Primary | ICD-10-CM

## 2024-04-18 PROBLEM — Z96.641 HISTORY OF TOTAL REPLACEMENT OF RIGHT HIP: Status: ACTIVE | Noted: 2024-04-18

## 2024-04-18 PROCEDURE — 72170 X-RAY EXAM OF PELVIS: CPT | Performed by: RADIOLOGY

## 2024-04-18 PROCEDURE — G0378 HOSPITAL OBSERVATION PER HR: HCPCS

## 2024-04-18 PROCEDURE — 2500000004 HC RX 250 GENERAL PHARMACY W/ HCPCS (ALT 636 FOR OP/ED): Performed by: STUDENT IN AN ORGANIZED HEALTH CARE EDUCATION/TRAINING PROGRAM

## 2024-04-18 PROCEDURE — 97110 THERAPEUTIC EXERCISES: CPT | Mod: GP

## 2024-04-18 PROCEDURE — A27130 PR TOTAL HIP ARTHROPLASTY: Performed by: NURSE ANESTHETIST, CERTIFIED REGISTERED

## 2024-04-18 PROCEDURE — C1713 ANCHOR/SCREW BN/BN,TIS/BN: HCPCS | Performed by: STUDENT IN AN ORGANIZED HEALTH CARE EDUCATION/TRAINING PROGRAM

## 2024-04-18 PROCEDURE — 72170 X-RAY EXAM OF PELVIS: CPT

## 2024-04-18 PROCEDURE — 2720000007 HC OR 272 NO HCPCS: Performed by: STUDENT IN AN ORGANIZED HEALTH CARE EDUCATION/TRAINING PROGRAM

## 2024-04-18 PROCEDURE — 2500000005 HC RX 250 GENERAL PHARMACY W/O HCPCS: Performed by: ANESTHESIOLOGY

## 2024-04-18 PROCEDURE — 3600000005 HC OR TIME - INITIAL BASE CHARGE - PROCEDURE LEVEL FIVE: Performed by: STUDENT IN AN ORGANIZED HEALTH CARE EDUCATION/TRAINING PROGRAM

## 2024-04-18 PROCEDURE — 27130 TOTAL HIP ARTHROPLASTY: CPT | Performed by: STUDENT IN AN ORGANIZED HEALTH CARE EDUCATION/TRAINING PROGRAM

## 2024-04-18 PROCEDURE — 2500000005 HC RX 250 GENERAL PHARMACY W/O HCPCS: Performed by: STUDENT IN AN ORGANIZED HEALTH CARE EDUCATION/TRAINING PROGRAM

## 2024-04-18 PROCEDURE — 3700000001 HC GENERAL ANESTHESIA TIME - INITIAL BASE CHARGE: Performed by: STUDENT IN AN ORGANIZED HEALTH CARE EDUCATION/TRAINING PROGRAM

## 2024-04-18 PROCEDURE — 2500000004 HC RX 250 GENERAL PHARMACY W/ HCPCS (ALT 636 FOR OP/ED): Performed by: NURSE ANESTHETIST, CERTIFIED REGISTERED

## 2024-04-18 PROCEDURE — 2500000005 HC RX 250 GENERAL PHARMACY W/O HCPCS: Performed by: NURSE ANESTHETIST, CERTIFIED REGISTERED

## 2024-04-18 PROCEDURE — 2500000001 HC RX 250 WO HCPCS SELF ADMINISTERED DRUGS (ALT 637 FOR MEDICARE OP): Performed by: STUDENT IN AN ORGANIZED HEALTH CARE EDUCATION/TRAINING PROGRAM

## 2024-04-18 PROCEDURE — 2780000003 HC OR 278 NO HCPCS: Performed by: STUDENT IN AN ORGANIZED HEALTH CARE EDUCATION/TRAINING PROGRAM

## 2024-04-18 PROCEDURE — 2500000004 HC RX 250 GENERAL PHARMACY W/ HCPCS (ALT 636 FOR OP/ED)

## 2024-04-18 PROCEDURE — 97607 NEG PRS WND THR NDME<=50SQCM: CPT | Performed by: STUDENT IN AN ORGANIZED HEALTH CARE EDUCATION/TRAINING PROGRAM

## 2024-04-18 PROCEDURE — 97116 GAIT TRAINING THERAPY: CPT | Mod: GP

## 2024-04-18 PROCEDURE — A4217 STERILE WATER/SALINE, 500 ML: HCPCS | Performed by: STUDENT IN AN ORGANIZED HEALTH CARE EDUCATION/TRAINING PROGRAM

## 2024-04-18 PROCEDURE — 7100000001 HC RECOVERY ROOM TIME - INITIAL BASE CHARGE: Performed by: STUDENT IN AN ORGANIZED HEALTH CARE EDUCATION/TRAINING PROGRAM

## 2024-04-18 PROCEDURE — A27130 PR TOTAL HIP ARTHROPLASTY: Performed by: ANESTHESIOLOGY

## 2024-04-18 PROCEDURE — 7100000002 HC RECOVERY ROOM TIME - EACH INCREMENTAL 1 MINUTE: Performed by: STUDENT IN AN ORGANIZED HEALTH CARE EDUCATION/TRAINING PROGRAM

## 2024-04-18 PROCEDURE — 97161 PT EVAL LOW COMPLEX 20 MIN: CPT | Mod: GP

## 2024-04-18 PROCEDURE — RXMED WILLOW AMBULATORY MEDICATION CHARGE

## 2024-04-18 PROCEDURE — C1776 JOINT DEVICE (IMPLANTABLE): HCPCS | Performed by: STUDENT IN AN ORGANIZED HEALTH CARE EDUCATION/TRAINING PROGRAM

## 2024-04-18 PROCEDURE — 3700000002 HC GENERAL ANESTHESIA TIME - EACH INCREMENTAL 1 MINUTE: Performed by: STUDENT IN AN ORGANIZED HEALTH CARE EDUCATION/TRAINING PROGRAM

## 2024-04-18 PROCEDURE — 2500000001 HC RX 250 WO HCPCS SELF ADMINISTERED DRUGS (ALT 637 FOR MEDICARE OP)

## 2024-04-18 PROCEDURE — 3600000010 HC OR TIME - EACH INCREMENTAL 1 MINUTE - PROCEDURE LEVEL FIVE: Performed by: STUDENT IN AN ORGANIZED HEALTH CARE EDUCATION/TRAINING PROGRAM

## 2024-04-18 PROCEDURE — 2500000006 HC RX 250 W HCPCS SELF ADMINISTERED DRUGS (ALT 637 FOR ALL PAYERS)

## 2024-04-18 DEVICE — BIOLOX DELTA CERAMIC FEMORAL HEAD +5.0 36MM DIA 12/14 TAPER
Type: IMPLANTABLE DEVICE | Site: HIP | Status: FUNCTIONAL
Brand: BIOLOX DELTA

## 2024-04-18 DEVICE — PINNACLE GRIPTION ACETABULAR SHELL SECTOR 52MM OD
Type: IMPLANTABLE DEVICE | Site: HIP | Status: FUNCTIONAL
Brand: PINNACLE GRIPTION

## 2024-04-18 DEVICE — PINNACLE CANCELLOUS BONE SCREW 6.5MM X 25MM
Type: IMPLANTABLE DEVICE | Site: HIP | Status: FUNCTIONAL
Brand: PINNACLE

## 2024-04-18 DEVICE — TRI-LOCK BPS FEMORAL STEM 12/14 TAPER TRI-LOCK BPS W/GRIPTION SIZE 4 HI 103MM
Type: IMPLANTABLE DEVICE | Site: HIP | Status: FUNCTIONAL
Brand: TRI-LOCK GRIPTION

## 2024-04-18 DEVICE — PINNACLE HIP SOLUTIONS ALTRX POLYETHYLENE ACETABULAR LINER NEUTRAL 36MM ID 52MM OD
Type: IMPLANTABLE DEVICE | Site: HIP | Status: FUNCTIONAL
Brand: PINNACLE ALTRX

## 2024-04-18 DEVICE — PINNACLE CANCELLOUS BONE SCREW 6.5MM X 30MM
Type: IMPLANTABLE DEVICE | Site: HIP | Status: FUNCTIONAL
Brand: PINNACLE

## 2024-04-18 RX ORDER — MIDAZOLAM HYDROCHLORIDE 1 MG/ML
INJECTION INTRAMUSCULAR; INTRAVENOUS AS NEEDED
Status: DISCONTINUED | OUTPATIENT
Start: 2024-04-18 | End: 2024-04-18

## 2024-04-18 RX ORDER — NALOXONE HYDROCHLORIDE 0.4 MG/ML
0.2 INJECTION, SOLUTION INTRAMUSCULAR; INTRAVENOUS; SUBCUTANEOUS EVERY 5 MIN PRN
Status: DISCONTINUED | OUTPATIENT
Start: 2024-04-18 | End: 2024-04-18

## 2024-04-18 RX ORDER — SODIUM CHLORIDE, SODIUM LACTATE, POTASSIUM CHLORIDE, CALCIUM CHLORIDE 600; 310; 30; 20 MG/100ML; MG/100ML; MG/100ML; MG/100ML
100 INJECTION, SOLUTION INTRAVENOUS CONTINUOUS
Status: DISCONTINUED | OUTPATIENT
Start: 2024-04-18 | End: 2024-04-19 | Stop reason: HOSPADM

## 2024-04-18 RX ORDER — TRAMADOL HYDROCHLORIDE 50 MG/1
50 TABLET ORAL EVERY 6 HOURS PRN
Status: DISCONTINUED | OUTPATIENT
Start: 2024-04-18 | End: 2024-04-19 | Stop reason: HOSPADM

## 2024-04-18 RX ORDER — SCOLOPAMINE TRANSDERMAL SYSTEM 1 MG/1
1 PATCH, EXTENDED RELEASE TRANSDERMAL ONCE
Status: DISCONTINUED | OUTPATIENT
Start: 2024-04-18 | End: 2024-04-19 | Stop reason: HOSPADM

## 2024-04-18 RX ORDER — TRANEXAMIC ACID 650 MG/1
1950 TABLET ORAL ONCE
Status: COMPLETED | OUTPATIENT
Start: 2024-04-18 | End: 2024-04-18

## 2024-04-18 RX ORDER — PROPOFOL 10 MG/ML
INJECTION, EMULSION INTRAVENOUS AS NEEDED
Status: DISCONTINUED | OUTPATIENT
Start: 2024-04-18 | End: 2024-04-18

## 2024-04-18 RX ORDER — CELECOXIB 200 MG/1
200 CAPSULE ORAL ONCE
Status: COMPLETED | OUTPATIENT
Start: 2024-04-18 | End: 2024-04-18

## 2024-04-18 RX ORDER — BUPIVACAINE HYDROCHLORIDE 7.5 MG/ML
INJECTION, SOLUTION INTRASPINAL AS NEEDED
Status: DISCONTINUED | OUTPATIENT
Start: 2024-04-18 | End: 2024-04-18

## 2024-04-18 RX ORDER — CEFAZOLIN 1 G/1
INJECTION, POWDER, FOR SOLUTION INTRAVENOUS AS NEEDED
Status: DISCONTINUED | OUTPATIENT
Start: 2024-04-18 | End: 2024-04-18

## 2024-04-18 RX ORDER — POLYETHYLENE GLYCOL 3350 17 G/17G
17 POWDER, FOR SOLUTION ORAL DAILY
Status: DISCONTINUED | OUTPATIENT
Start: 2024-04-18 | End: 2024-04-19 | Stop reason: HOSPADM

## 2024-04-18 RX ORDER — NORETHINDRONE AND ETHINYL ESTRADIOL 0.5-0.035
KIT ORAL AS NEEDED
Status: DISCONTINUED | OUTPATIENT
Start: 2024-04-18 | End: 2024-04-18

## 2024-04-18 RX ORDER — FERROUS SULFATE 325(65) MG
65 TABLET ORAL
Status: DISCONTINUED | OUTPATIENT
Start: 2024-04-18 | End: 2024-04-19 | Stop reason: HOSPADM

## 2024-04-18 RX ORDER — ACETAMINOPHEN 325 MG/1
975 TABLET ORAL EVERY 8 HOURS
Status: DISCONTINUED | OUTPATIENT
Start: 2024-04-18 | End: 2024-04-19 | Stop reason: HOSPADM

## 2024-04-18 RX ORDER — KETOROLAC TROMETHAMINE 30 MG/ML
15 INJECTION, SOLUTION INTRAMUSCULAR; INTRAVENOUS EVERY 6 HOURS
Qty: 4 ML | Refills: 0 | Status: DISCONTINUED | OUTPATIENT
Start: 2024-04-18 | End: 2024-04-19 | Stop reason: HOSPADM

## 2024-04-18 RX ORDER — OXYCODONE HYDROCHLORIDE 5 MG/1
5 TABLET ORAL EVERY 4 HOURS PRN
Status: DISCONTINUED | OUTPATIENT
Start: 2024-04-18 | End: 2024-04-18 | Stop reason: HOSPADM

## 2024-04-18 RX ORDER — OXYCODONE HYDROCHLORIDE 5 MG/1
10 TABLET ORAL EVERY 6 HOURS PRN
Status: DISCONTINUED | OUTPATIENT
Start: 2024-04-18 | End: 2024-04-19 | Stop reason: HOSPADM

## 2024-04-18 RX ORDER — ONDANSETRON 4 MG/1
4 TABLET, ORALLY DISINTEGRATING ORAL EVERY 8 HOURS PRN
Status: DISCONTINUED | OUTPATIENT
Start: 2024-04-18 | End: 2024-04-19 | Stop reason: HOSPADM

## 2024-04-18 RX ORDER — PROPOFOL 10 MG/ML
INJECTION, EMULSION INTRAVENOUS CONTINUOUS PRN
Status: DISCONTINUED | OUTPATIENT
Start: 2024-04-18 | End: 2024-04-18

## 2024-04-18 RX ORDER — FENTANYL CITRATE 50 UG/ML
INJECTION, SOLUTION INTRAMUSCULAR; INTRAVENOUS AS NEEDED
Status: DISCONTINUED | OUTPATIENT
Start: 2024-04-18 | End: 2024-04-18

## 2024-04-18 RX ORDER — PHENYLEPHRINE HCL IN 0.9% NACL 1 MG/10 ML
SYRINGE (ML) INTRAVENOUS AS NEEDED
Status: DISCONTINUED | OUTPATIENT
Start: 2024-04-18 | End: 2024-04-18

## 2024-04-18 RX ORDER — ASPIRIN 81 MG/1
81 TABLET ORAL 2 TIMES DAILY
Status: DISCONTINUED | OUTPATIENT
Start: 2024-04-18 | End: 2024-04-19 | Stop reason: HOSPADM

## 2024-04-18 RX ORDER — SODIUM CHLORIDE, SODIUM LACTATE, POTASSIUM CHLORIDE, CALCIUM CHLORIDE 600; 310; 30; 20 MG/100ML; MG/100ML; MG/100ML; MG/100ML
100 INJECTION, SOLUTION INTRAVENOUS CONTINUOUS
Status: DISCONTINUED | OUTPATIENT
Start: 2024-04-18 | End: 2024-04-18 | Stop reason: HOSPADM

## 2024-04-18 RX ORDER — DIPHENHYDRAMINE HYDROCHLORIDE 50 MG/ML
12.5 INJECTION INTRAMUSCULAR; INTRAVENOUS EVERY 6 HOURS PRN
Status: DISCONTINUED | OUTPATIENT
Start: 2024-04-18 | End: 2024-04-19 | Stop reason: HOSPADM

## 2024-04-18 RX ORDER — ONDANSETRON HYDROCHLORIDE 2 MG/ML
4 INJECTION, SOLUTION INTRAVENOUS EVERY 8 HOURS PRN
Status: DISCONTINUED | OUTPATIENT
Start: 2024-04-18 | End: 2024-04-19 | Stop reason: HOSPADM

## 2024-04-18 RX ORDER — PANTOPRAZOLE SODIUM 40 MG/1
40 TABLET, DELAYED RELEASE ORAL
Status: DISCONTINUED | OUTPATIENT
Start: 2024-04-19 | End: 2024-04-19 | Stop reason: HOSPADM

## 2024-04-18 RX ORDER — KETOROLAC TROMETHAMINE 30 MG/ML
INJECTION, SOLUTION INTRAMUSCULAR; INTRAVENOUS AS NEEDED
Status: DISCONTINUED | OUTPATIENT
Start: 2024-04-18 | End: 2024-04-18

## 2024-04-18 RX ORDER — ROPIVACAINE/EPI/CLONIDINE/KET 2.46-0.005
SYRINGE (ML) INJECTION AS NEEDED
Status: DISCONTINUED | OUTPATIENT
Start: 2024-04-18 | End: 2024-04-18 | Stop reason: HOSPADM

## 2024-04-18 RX ORDER — LIDOCAINE HYDROCHLORIDE 20 MG/ML
INJECTION, SOLUTION EPIDURAL; INFILTRATION; INTRACAUDAL; PERINEURAL AS NEEDED
Status: DISCONTINUED | OUTPATIENT
Start: 2024-04-18 | End: 2024-04-18

## 2024-04-18 RX ORDER — ACETAMINOPHEN 325 MG/1
650 TABLET ORAL ONCE
Status: COMPLETED | OUTPATIENT
Start: 2024-04-18 | End: 2024-04-18

## 2024-04-18 RX ORDER — LABETALOL HYDROCHLORIDE 5 MG/ML
5 INJECTION, SOLUTION INTRAVENOUS ONCE AS NEEDED
Status: DISCONTINUED | OUTPATIENT
Start: 2024-04-18 | End: 2024-04-18 | Stop reason: HOSPADM

## 2024-04-18 RX ORDER — LIDOCAINE HYDROCHLORIDE 10 MG/ML
0.1 INJECTION, SOLUTION EPIDURAL; INFILTRATION; INTRACAUDAL; PERINEURAL ONCE
Status: DISCONTINUED | OUTPATIENT
Start: 2024-04-18 | End: 2024-04-18 | Stop reason: HOSPADM

## 2024-04-18 RX ORDER — OXYCODONE HYDROCHLORIDE 5 MG/1
5 TABLET ORAL EVERY 6 HOURS PRN
Status: DISCONTINUED | OUTPATIENT
Start: 2024-04-18 | End: 2024-04-19 | Stop reason: HOSPADM

## 2024-04-18 RX ORDER — NALOXONE HYDROCHLORIDE 0.4 MG/ML
0.2 INJECTION, SOLUTION INTRAMUSCULAR; INTRAVENOUS; SUBCUTANEOUS EVERY 5 MIN PRN
Status: DISCONTINUED | OUTPATIENT
Start: 2024-04-18 | End: 2024-04-19 | Stop reason: HOSPADM

## 2024-04-18 RX ORDER — SODIUM CHLORIDE 0.9 G/100ML
IRRIGANT IRRIGATION AS NEEDED
Status: DISCONTINUED | OUTPATIENT
Start: 2024-04-18 | End: 2024-04-18 | Stop reason: HOSPADM

## 2024-04-18 RX ORDER — DOCUSATE SODIUM 100 MG/1
100 CAPSULE, LIQUID FILLED ORAL 2 TIMES DAILY
Status: DISCONTINUED | OUTPATIENT
Start: 2024-04-18 | End: 2024-04-19 | Stop reason: HOSPADM

## 2024-04-18 RX ORDER — ONDANSETRON HYDROCHLORIDE 2 MG/ML
INJECTION, SOLUTION INTRAVENOUS AS NEEDED
Status: DISCONTINUED | OUTPATIENT
Start: 2024-04-18 | End: 2024-04-18

## 2024-04-18 RX ORDER — ALBUTEROL SULFATE 0.83 MG/ML
2.5 SOLUTION RESPIRATORY (INHALATION) ONCE AS NEEDED
Status: DISCONTINUED | OUTPATIENT
Start: 2024-04-18 | End: 2024-04-18 | Stop reason: HOSPADM

## 2024-04-18 RX ORDER — TRANEXAMIC ACID 100 MG/ML
INJECTION, SOLUTION INTRAVENOUS AS NEEDED
Status: DISCONTINUED | OUTPATIENT
Start: 2024-04-18 | End: 2024-04-18

## 2024-04-18 RX ORDER — ONDANSETRON HYDROCHLORIDE 2 MG/ML
4 INJECTION, SOLUTION INTRAVENOUS ONCE AS NEEDED
Status: DISCONTINUED | OUTPATIENT
Start: 2024-04-18 | End: 2024-04-18 | Stop reason: HOSPADM

## 2024-04-18 RX ORDER — DIPHENHYDRAMINE HCL 25 MG
25 CAPSULE ORAL EVERY 6 HOURS PRN
Status: DISCONTINUED | OUTPATIENT
Start: 2024-04-18 | End: 2024-04-19 | Stop reason: HOSPADM

## 2024-04-18 RX ORDER — HYDRALAZINE HYDROCHLORIDE 20 MG/ML
5 INJECTION INTRAMUSCULAR; INTRAVENOUS EVERY 30 MIN PRN
Status: DISCONTINUED | OUTPATIENT
Start: 2024-04-18 | End: 2024-04-18 | Stop reason: HOSPADM

## 2024-04-18 RX ORDER — CEFAZOLIN SODIUM 2 G/100ML
2 INJECTION, SOLUTION INTRAVENOUS EVERY 6 HOURS
Qty: 300 ML | Refills: 0 | Status: COMPLETED | OUTPATIENT
Start: 2024-04-18 | End: 2024-04-19

## 2024-04-18 RX ORDER — BISACODYL 5 MG
10 TABLET, DELAYED RELEASE (ENTERIC COATED) ORAL DAILY PRN
Status: DISCONTINUED | OUTPATIENT
Start: 2024-04-18 | End: 2024-04-19 | Stop reason: HOSPADM

## 2024-04-18 RX ADMIN — DEXAMETHASONE SODIUM PHOSPHATE 4 MG: 4 INJECTION, SOLUTION INTRA-ARTICULAR; INTRALESIONAL; INTRAMUSCULAR; INTRAVENOUS; SOFT TISSUE at 11:04

## 2024-04-18 RX ADMIN — EPHEDRINE SULFATE 10 MG: 50 INJECTION, SOLUTION INTRAVENOUS at 10:21

## 2024-04-18 RX ADMIN — ONDANSETRON 4 MG: 2 INJECTION INTRAMUSCULAR; INTRAVENOUS at 11:05

## 2024-04-18 RX ADMIN — TRAMADOL HYDROCHLORIDE 50 MG: 50 TABLET, COATED ORAL at 20:18

## 2024-04-18 RX ADMIN — MIDAZOLAM HYDROCHLORIDE 2 MG: 1 INJECTION, SOLUTION INTRAMUSCULAR; INTRAVENOUS at 09:04

## 2024-04-18 RX ADMIN — PROPOFOL 50 MG: 10 INJECTION, EMULSION INTRAVENOUS at 09:36

## 2024-04-18 RX ADMIN — DOCUSATE SODIUM 100 MG: 100 CAPSULE, LIQUID FILLED ORAL at 14:55

## 2024-04-18 RX ADMIN — FENTANYL CITRATE 50 MCG: 50 INJECTION, SOLUTION INTRAMUSCULAR; INTRAVENOUS at 09:05

## 2024-04-18 RX ADMIN — CEFAZOLIN SODIUM 2 G: 2 INJECTION, SOLUTION INTRAVENOUS at 20:17

## 2024-04-18 RX ADMIN — CEFAZOLIN 2 G: 330 INJECTION, POWDER, FOR SOLUTION INTRAMUSCULAR; INTRAVENOUS at 09:17

## 2024-04-18 RX ADMIN — SODIUM CHLORIDE, POTASSIUM CHLORIDE, SODIUM LACTATE AND CALCIUM CHLORIDE 100 ML/HR: 600; 310; 30; 20 INJECTION, SOLUTION INTRAVENOUS at 13:15

## 2024-04-18 RX ADMIN — BUPIVACAINE HYDROCHLORIDE IN DEXTROSE 1.8 ML: 7.5 INJECTION, SOLUTION SUBARACHNOID at 09:25

## 2024-04-18 RX ADMIN — CEFAZOLIN SODIUM 2 G: 2 INJECTION, SOLUTION INTRAVENOUS at 14:55

## 2024-04-18 RX ADMIN — Medication 100 MCG: at 10:27

## 2024-04-18 RX ADMIN — EPHEDRINE SULFATE 10 MG: 50 INJECTION, SOLUTION INTRAVENOUS at 10:12

## 2024-04-18 RX ADMIN — HYDROMORPHONE HYDROCHLORIDE 0.2 MG: 0.2 INJECTION, SOLUTION INTRAMUSCULAR; INTRAVENOUS; SUBCUTANEOUS at 21:38

## 2024-04-18 RX ADMIN — EPHEDRINE SULFATE 10 MG: 50 INJECTION, SOLUTION INTRAVENOUS at 10:07

## 2024-04-18 RX ADMIN — ACETAMINOPHEN 650 MG: 325 TABLET ORAL at 06:44

## 2024-04-18 RX ADMIN — Medication 100 MCG: at 10:47

## 2024-04-18 RX ADMIN — PROPOFOL 75 MCG/KG/MIN: 10 INJECTION, EMULSION INTRAVENOUS at 09:37

## 2024-04-18 RX ADMIN — FENTANYL CITRATE 50 MCG: 50 INJECTION, SOLUTION INTRAMUSCULAR; INTRAVENOUS at 09:36

## 2024-04-18 RX ADMIN — Medication 100 MCG: at 10:45

## 2024-04-18 RX ADMIN — DOCUSATE SODIUM 100 MG: 100 CAPSULE, LIQUID FILLED ORAL at 20:17

## 2024-04-18 RX ADMIN — EPHEDRINE SULFATE 10 MG: 50 INJECTION, SOLUTION INTRAVENOUS at 10:05

## 2024-04-18 RX ADMIN — ACETAMINOPHEN 975 MG: 325 TABLET ORAL at 14:55

## 2024-04-18 RX ADMIN — EPHEDRINE SULFATE 5 MG: 50 INJECTION, SOLUTION INTRAVENOUS at 09:54

## 2024-04-18 RX ADMIN — TRANEXAMIC ACID 1000 MG: 1 INJECTION, SOLUTION INTRAVENOUS at 09:22

## 2024-04-18 RX ADMIN — ASPIRIN 81 MG: 81 TABLET, COATED ORAL at 20:17

## 2024-04-18 RX ADMIN — SODIUM CHLORIDE, POTASSIUM CHLORIDE, SODIUM LACTATE AND CALCIUM CHLORIDE 100 ML/HR: 600; 310; 30; 20 INJECTION, SOLUTION INTRAVENOUS at 23:30

## 2024-04-18 RX ADMIN — ACETAMINOPHEN 975 MG: 325 TABLET ORAL at 23:28

## 2024-04-18 RX ADMIN — PROPOFOL 50 MG: 10 INJECTION, EMULSION INTRAVENOUS at 11:06

## 2024-04-18 RX ADMIN — PROPOFOL 50 MG: 10 INJECTION, EMULSION INTRAVENOUS at 09:41

## 2024-04-18 RX ADMIN — FERROUS SULFATE TAB 325 MG (65 MG ELEMENTAL FE) 1 TABLET: 325 (65 FE) TAB at 16:34

## 2024-04-18 RX ADMIN — POVIDONE-IODINE 1 APPLICATION: 5 SOLUTION TOPICAL at 06:44

## 2024-04-18 RX ADMIN — SODIUM CHLORIDE, POTASSIUM CHLORIDE, SODIUM LACTATE AND CALCIUM CHLORIDE 100 ML/HR: 600; 310; 30; 20 INJECTION, SOLUTION INTRAVENOUS at 06:45

## 2024-04-18 RX ADMIN — POLYETHYLENE GLYCOL 3350 17 G: 17 POWDER, FOR SOLUTION ORAL at 14:56

## 2024-04-18 RX ADMIN — TRANEXAMIC ACID 1950 MG: 650 TABLET ORAL at 14:55

## 2024-04-18 RX ADMIN — SODIUM CHLORIDE, POTASSIUM CHLORIDE, SODIUM LACTATE AND CALCIUM CHLORIDE: 600; 310; 30; 20 INJECTION, SOLUTION INTRAVENOUS at 09:00

## 2024-04-18 RX ADMIN — EPHEDRINE SULFATE 5 MG: 50 INJECTION, SOLUTION INTRAVENOUS at 09:56

## 2024-04-18 RX ADMIN — KETOROLAC TROMETHAMINE 15 MG: 30 INJECTION, SOLUTION INTRAMUSCULAR; INTRAVENOUS at 11:04

## 2024-04-18 RX ADMIN — CELECOXIB 200 MG: 200 CAPSULE ORAL at 06:44

## 2024-04-18 RX ADMIN — OXYCODONE HYDROCHLORIDE 10 MG: 5 TABLET ORAL at 14:57

## 2024-04-18 RX ADMIN — PROPOFOL 50 MG: 10 INJECTION, EMULSION INTRAVENOUS at 10:45

## 2024-04-18 RX ADMIN — MIDAZOLAM HYDROCHLORIDE 2 MG: 1 INJECTION, SOLUTION INTRAMUSCULAR; INTRAVENOUS at 09:00

## 2024-04-18 RX ADMIN — LIDOCAINE HYDROCHLORIDE 50 MG: 20 INJECTION, SOLUTION EPIDURAL; INFILTRATION; INTRACAUDAL; PERINEURAL at 09:36

## 2024-04-18 RX ADMIN — KETOROLAC TROMETHAMINE 15 MG: 30 INJECTION, SOLUTION INTRAMUSCULAR at 23:28

## 2024-04-18 RX ADMIN — HYDROMORPHONE HYDROCHLORIDE 0.2 MG: 0.2 INJECTION, SOLUTION INTRAMUSCULAR; INTRAVENOUS; SUBCUTANEOUS at 16:34

## 2024-04-18 RX ADMIN — KETOROLAC TROMETHAMINE 15 MG: 30 INJECTION, SOLUTION INTRAMUSCULAR at 16:35

## 2024-04-18 SDOH — SOCIAL STABILITY: SOCIAL INSECURITY: HAS ANYONE EVER THREATENED TO HURT YOUR FAMILY OR YOUR PETS?: NO

## 2024-04-18 SDOH — SOCIAL STABILITY: SOCIAL INSECURITY: HAVE YOU HAD THOUGHTS OF HARMING ANYONE ELSE?: NO

## 2024-04-18 SDOH — SOCIAL STABILITY: SOCIAL INSECURITY: WERE YOU ABLE TO COMPLETE ALL THE BEHAVIORAL HEALTH SCREENINGS?: YES

## 2024-04-18 SDOH — SOCIAL STABILITY: SOCIAL INSECURITY: DOES ANYONE TRY TO KEEP YOU FROM HAVING/CONTACTING OTHER FRIENDS OR DOING THINGS OUTSIDE YOUR HOME?: NO

## 2024-04-18 SDOH — SOCIAL STABILITY: SOCIAL INSECURITY: ABUSE: ADULT

## 2024-04-18 SDOH — SOCIAL STABILITY: SOCIAL INSECURITY: HAVE YOU HAD ANY THOUGHTS OF HARMING ANYONE ELSE?: NO

## 2024-04-18 SDOH — SOCIAL STABILITY: SOCIAL INSECURITY: DO YOU FEEL UNSAFE GOING BACK TO THE PLACE WHERE YOU ARE LIVING?: NO

## 2024-04-18 SDOH — SOCIAL STABILITY: SOCIAL INSECURITY: DO YOU FEEL ANYONE HAS EXPLOITED OR TAKEN ADVANTAGE OF YOU FINANCIALLY OR OF YOUR PERSONAL PROPERTY?: NO

## 2024-04-18 SDOH — SOCIAL STABILITY: SOCIAL INSECURITY: ARE YOU OR HAVE YOU BEEN THREATENED OR ABUSED PHYSICALLY, EMOTIONALLY, OR SEXUALLY BY ANYONE?: NO

## 2024-04-18 SDOH — SOCIAL STABILITY: SOCIAL INSECURITY: ARE THERE ANY APPARENT SIGNS OF INJURIES/BEHAVIORS THAT COULD BE RELATED TO ABUSE/NEGLECT?: NO

## 2024-04-18 ASSESSMENT — PAIN - FUNCTIONAL ASSESSMENT
PAIN_FUNCTIONAL_ASSESSMENT: 0-10

## 2024-04-18 ASSESSMENT — PAIN DESCRIPTION - ORIENTATION
ORIENTATION: RIGHT

## 2024-04-18 ASSESSMENT — ACTIVITIES OF DAILY LIVING (ADL)
HEARING - RIGHT EAR: FUNCTIONAL
HEARING - LEFT EAR: FUNCTIONAL
ADEQUATE_TO_COMPLETE_ADL: YES
GROOMING: INDEPENDENT
PATIENT'S MEMORY ADEQUATE TO SAFELY COMPLETE DAILY ACTIVITIES?: YES
ADL_ASSISTANCE: INDEPENDENT
BATHING: NEEDS ASSISTANCE
FEEDING YOURSELF: INDEPENDENT
TOILETING: NEEDS ASSISTANCE
DRESSING YOURSELF: INDEPENDENT
ASSISTIVE_DEVICE: WALKER
LACK_OF_TRANSPORTATION: NO
WALKS IN HOME: NEEDS ASSISTANCE
JUDGMENT_ADEQUATE_SAFELY_COMPLETE_DAILY_ACTIVITIES: YES

## 2024-04-18 ASSESSMENT — COGNITIVE AND FUNCTIONAL STATUS - GENERAL
TURNING FROM BACK TO SIDE WHILE IN FLAT BAD: A LITTLE
DRESSING REGULAR UPPER BODY CLOTHING: A LITTLE
TURNING FROM BACK TO SIDE WHILE IN FLAT BAD: A LITTLE
DRESSING REGULAR LOWER BODY CLOTHING: A LOT
MOVING TO AND FROM BED TO CHAIR: A LITTLE
STANDING UP FROM CHAIR USING ARMS: A LITTLE
DRESSING REGULAR UPPER BODY CLOTHING: A LITTLE
MOBILITY SCORE: 18
PERSONAL GROOMING: A LITTLE
MOVING FROM LYING ON BACK TO SITTING ON SIDE OF FLAT BED WITH BEDRAILS: A LITTLE
MOBILITY SCORE: 18
HELP NEEDED FOR BATHING: A LITTLE
PERSONAL GROOMING: A LITTLE
MOVING FROM LYING ON BACK TO SITTING ON SIDE OF FLAT BED WITH BEDRAILS: A LITTLE
EATING MEALS: A LITTLE
DRESSING REGULAR LOWER BODY CLOTHING: A LOT
CLIMB 3 TO 5 STEPS WITH RAILING: A LITTLE
PERSONAL GROOMING: A LITTLE
MOVING FROM LYING ON BACK TO SITTING ON SIDE OF FLAT BED WITH BEDRAILS: A LITTLE
MOVING TO AND FROM BED TO CHAIR: A LITTLE
TOILETING: A LITTLE
TOILETING: A LITTLE
DAILY ACTIVITIY SCORE: 17
EATING MEALS: A LITTLE
MOVING TO AND FROM BED TO CHAIR: A LITTLE
MOBILITY SCORE: 17
WALKING IN HOSPITAL ROOM: A LITTLE
STANDING UP FROM CHAIR USING ARMS: A LITTLE
CLIMB 3 TO 5 STEPS WITH RAILING: A LITTLE
TOILETING: A LITTLE
EATING MEALS: A LITTLE
WALKING IN HOSPITAL ROOM: A LITTLE
MOVING TO AND FROM BED TO CHAIR: A LITTLE
EATING MEALS: A LITTLE
DRESSING REGULAR UPPER BODY CLOTHING: A LITTLE
MOVING FROM LYING ON BACK TO SITTING ON SIDE OF FLAT BED WITH BEDRAILS: A LITTLE
MOVING TO AND FROM BED TO CHAIR: A LITTLE
TURNING FROM BACK TO SIDE WHILE IN FLAT BAD: A LITTLE
DAILY ACTIVITIY SCORE: 17
MOBILITY SCORE: 18
DRESSING REGULAR LOWER BODY CLOTHING: A LOT
CLIMB 3 TO 5 STEPS WITH RAILING: A LITTLE
STANDING UP FROM CHAIR USING ARMS: A LITTLE
HELP NEEDED FOR BATHING: A LITTLE
DRESSING REGULAR LOWER BODY CLOTHING: A LOT
DAILY ACTIVITIY SCORE: 17
TOILETING: A LITTLE
WALKING IN HOSPITAL ROOM: A LITTLE
DRESSING REGULAR UPPER BODY CLOTHING: A LITTLE
MOVING FROM LYING ON BACK TO SITTING ON SIDE OF FLAT BED WITH BEDRAILS: A LITTLE
HELP NEEDED FOR BATHING: A LITTLE
PERSONAL GROOMING: A LITTLE
MOBILITY SCORE: 18
HELP NEEDED FOR BATHING: A LITTLE
WALKING IN HOSPITAL ROOM: A LITTLE
CLIMB 3 TO 5 STEPS WITH RAILING: A LITTLE
STANDING UP FROM CHAIR USING ARMS: A LITTLE
DAILY ACTIVITIY SCORE: 17
WALKING IN HOSPITAL ROOM: A LITTLE
PATIENT BASELINE BEDBOUND: NO
STANDING UP FROM CHAIR USING ARMS: A LITTLE
CLIMB 3 TO 5 STEPS WITH RAILING: A LOT

## 2024-04-18 ASSESSMENT — COLUMBIA-SUICIDE SEVERITY RATING SCALE - C-SSRS
6. HAVE YOU EVER DONE ANYTHING, STARTED TO DO ANYTHING, OR PREPARED TO DO ANYTHING TO END YOUR LIFE?: NO
2. HAVE YOU ACTUALLY HAD ANY THOUGHTS OF KILLING YOURSELF?: NO
1. IN THE PAST MONTH, HAVE YOU WISHED YOU WERE DEAD OR WISHED YOU COULD GO TO SLEEP AND NOT WAKE UP?: NO

## 2024-04-18 ASSESSMENT — PAIN SCALES - GENERAL
PAINLEVEL_OUTOF10: 0 - NO PAIN
PAINLEVEL_OUTOF10: 0 - NO PAIN
PAINLEVEL_OUTOF10: 8
PAINLEVEL_OUTOF10: 8
PAINLEVEL_OUTOF10: 4
PAINLEVEL_OUTOF10: 6
PAINLEVEL_OUTOF10: 8
PAINLEVEL_OUTOF10: 3
PAINLEVEL_OUTOF10: 0 - NO PAIN
PAINLEVEL_OUTOF10: 8
PAINLEVEL_OUTOF10: 5 - MODERATE PAIN
PAINLEVEL_OUTOF10: 0 - NO PAIN
PAINLEVEL_OUTOF10: 6
PAINLEVEL_OUTOF10: 4

## 2024-04-18 ASSESSMENT — PATIENT HEALTH QUESTIONNAIRE - PHQ9
2. FEELING DOWN, DEPRESSED OR HOPELESS: NOT AT ALL
SUM OF ALL RESPONSES TO PHQ9 QUESTIONS 1 & 2: 0
1. LITTLE INTEREST OR PLEASURE IN DOING THINGS: NOT AT ALL

## 2024-04-18 ASSESSMENT — LIFESTYLE VARIABLES
HOW OFTEN DO YOU HAVE A DRINK CONTAINING ALCOHOL: MONTHLY OR LESS
AUDIT-C TOTAL SCORE: 1
SKIP TO QUESTIONS 9-10: 1
AUDIT-C TOTAL SCORE: 1
HOW MANY STANDARD DRINKS CONTAINING ALCOHOL DO YOU HAVE ON A TYPICAL DAY: 1 OR 2
HOW OFTEN DO YOU HAVE 6 OR MORE DRINKS ON ONE OCCASION: NEVER

## 2024-04-18 ASSESSMENT — PAIN DESCRIPTION - LOCATION
LOCATION: HIP

## 2024-04-18 ASSESSMENT — PAIN DESCRIPTION - DESCRIPTORS: DESCRIPTORS: ACHING

## 2024-04-18 NOTE — ANESTHESIA PREPROCEDURE EVALUATION
"Patient: Lisa Brewster    Procedure Information       Date/Time: 04/18/24 0730    Procedure: Right Hip Total Arthroplasty (Right: Hip)    Location: [facility] A OR 12 / [facility] A OR    Surgeons: Jair Dill MD          69 yo F hx obesity, LBP, HTN, hypothyroid, OA.  Had \"awareness\" under anesthesia during a spinal/MAC 5/2022 for TKA but reviewed records and had lighter sedation due to low BP.    Relevant Problems   Musculoskeletal   (+) Primary osteoarthritis of right hip       Clinical information reviewed:   Tobacco  Allergies  Meds   Med Hx  Surg Hx   Fam Hx  Soc Hx        NPO Detail:  NPO/Void Status  Carbohydrate Drink Given Prior to Surgery? : N  Date of Last Liquid: 04/18/24  Time of Last Liquid: 0000  Date of Last Solid: 04/17/24  Time of Last Solid: 1800  Last Intake Type: Clear fluids (water)  Time of Last Void: 0630         Physical Exam    Airway  Mallampati: II  TM distance: >3 FB  Neck ROM: full     Cardiovascular   Rate: normal     Dental - normal exam     Pulmonary - normal exam     Abdominal            Anesthesia Plan    History of general anesthesia?: yes  History of complications of general anesthesia?: no    ASA 2     spinal and MAC   (GA as backup)  intravenous induction   Postoperative administration of opioids is intended.  Anesthetic plan and risks discussed with patient.      "

## 2024-04-18 NOTE — ANESTHESIA POSTPROCEDURE EVALUATION
Patient: Lisa Brewster    Procedure Summary       Date: 04/18/24 Room / Location: [facility] A OR 12 / [facility]    Anesthesia Start: 0902 Anesthesia Stop: 1144    Procedure: Right Hip Total Arthroplasty (Right: Hip) Diagnosis:       Primary osteoarthritis of right hip      (Primary osteoarthritis of right hip [M16.11])    Surgeons: Jiar Dill MD Responsible Provider: Anuel Maya MD    Anesthesia Type: MAC, spinal ASA Status: 2            Anesthesia Type: MAC, spinal    Vitals Value Taken Time   /68 04/18/24 1230   Temp 36 °C (96.8 °F) 04/18/24 1230   Pulse 73 04/18/24 1230   Resp 17 04/18/24 1230   SpO2 98 % 04/18/24 1230       Anesthesia Post Evaluation    Patient participation: complete - patient participated  Level of consciousness: awake  Pain management: adequate  Airway patency: patent  Cardiovascular status: acceptable and hemodynamically stable  Respiratory status: acceptable  Hydration status: acceptable  Postoperative Nausea and Vomiting: none        There were no known notable events for this encounter.

## 2024-04-18 NOTE — PROGRESS NOTES
Physical Therapy    Physical Therapy Evaluation & Treatment    Patient Name: Lisa Brewster  MRN: [id number]  Today's Date: 4/18/2024   Time Calculation  Start Time: 1411  Stop Time: 1454  Time Calculation (min): 43 min    Assessment/Plan   PT Assessment  PT Assessment Results: Decreased strength, Decreased endurance, Impaired balance, Decreased mobility, Pain, Orthopedic restrictions  Rehab Prognosis: Good  Barriers to Discharge: None identified  Evaluation/Treatment Tolerance: Other (Comment) (Dizziness with ambulation)  Medical Staff Made Aware: Yes  Strengths: Ability to acquire knowledge, Access to adaptive/assistive products, Support and attitude of living partners  End of Session Communication: Bedside nurse  Assessment Comment: Pt presents today with decreased RLE strength, balance, and activity tolerance. Currently, pt is below the reported PLOF requiring min assist for transfers and bed mobility. Continued PT would benefit the pt to address the above factors and bring the pt closer to the PLOF while minimizing fall risk.  End of Session Patient Position: Bed, 3 rail up, Alarm on   IP OR SWING BED PT PLAN  Inpatient or Swing Bed: Inpatient  PT Plan  Treatment/Interventions: Bed mobility, Transfer training, Gait training, Balance training, Strengthening, Endurance training, Therapeutic exercise, Therapeutic activity, Home exercise program, Positioning, Postural re-education  PT Plan: Skilled PT  PT Frequency: BID  PT Discharge Recommendations: Low intensity level of continued care  Equipment Recommended upon Discharge: Wheeled walker (Pt owns a FWW)  PT Recommended Transfer Status: Assist x1, Assistive device (Min assist)  PT - OK to Discharge: Yes (Per PT POC)    Subjective     General Visit Information:  General  Reason for Referral: 67 y/o F s/p R ANNA on 4/18/24  Referred By: VICTOR HUGO Dill  Past Medical History Relevant to Rehab:   Past Medical History:   Diagnosis Date    Awareness under anesthesia      knee surgery    Chronic low back pain     s/p injections    Delayed emergence from general anesthesia     during toe surgery    Hypertension     Hyponatremia     Na 134 - 3/18/24    Hypothyroidism     s/p thyroidectomy d/t hyperthyroidism - benign    Obesity (BMI 30-39.9)     3/18/24: BMI 39.05    Osteoarthritis     [place]      Family/Caregiver Present: Yes  Caregiver Feedback:  present  Prior to Session Communication: Bedside nurse  Patient Position Received: Bed, 3 rail up, Alarm off, not on at start of session  Preferred Learning Style: auditory, kinesthetic, visual, written  General Comment: Pt supine in bed upon PT arrival. Cleared to participate with RN, and agreeable to PT evaluation.  Home Living:  Home Living  Type of Home: House  Lives With: Spouse  Home Adaptive Equipment: Walker rolling or standard (Hip kit, ice machine)  Home Layout: Two level, Laundry in basement, Stairs to alternate level with rails  Alternate Level Stairs-Rails: Left  Alternate Level Stairs-Number of Steps: 16  Home Access: Stairs to enter without rails  Entrance Stairs-Rails: None  Entrance Stairs-Number of Steps: 1  Bathroom Shower/Tub: Tub/shower unit, Walk-in shower  Bathroom Toilet: Standard  Bathroom Equipment: Grab bars in shower, Tub transfer bench  Bathroom Accessibility: Pt has a tub/shower on the main level and a WIS upstairs  Prior Level of Function:  Prior Function Per Pt/Caregiver Report  Level of [place]: Independent with ADLs and functional transfers  Receives Help From: Family  ADL Assistance: Independent  Homemaking Assistance:  (Spouse completes IADLs)  Ambulatory Assistance: Independent (Mod IND with cane as needed in the home)  Vocational: Full time employment (Pt reports employment at [employer])  Prior Function Comments: +Driving. Pt denies recent falls  Precautions:  Precautions  LE Weight Bearing Status:  (FWB RLE)  Medical Precautions: Fall precautions  Post-Surgical Precautions: Right hip  precautions    Objective   Pain:  Pain Assessment  Pain Assessment: 0-10  Pain Score: 3  Pain Type: Surgical pain  Pain Location: Hip  Pain Orientation: Right  Pain Interventions: Ambulation/increased activity  Response to Interventions: 6/10 pain reported  Cognition:  Cognition  Orientation Level: Oriented X4    General Assessments:  Activity Tolerance  Endurance: Decreased tolerance for upright activites  Activity Tolerance Comments: Onset of dizziness with upright sitting and ambulation.    Sensation  Sensation Comment: Pt reports numbness in the feet that is improving    Coordination  Movements are Fluid and Coordinated: Yes    Postural Control  Postural Control: Impaired  Trunk Control: Mild trunk flexion in standing with FWW support    Static Sitting Balance  Static Sitting-Balance Support: Bilateral upper extremity supported, Feet supported  Static Sitting-Level of Assistance: Distant supervision  Static Sitting-Comment/Number of Minutes: About 4 minutes  Dynamic Sitting Balance  Dynamic Sitting-Balance Support: Bilateral upper extremity supported, Feet supported    Static Standing Balance  Static Standing-Balance Support: Bilateral upper extremity supported  Static Standing-Level of Assistance: Contact guard  Static Standing-Comment/Number of Minutes: With FWW support  Dynamic Standing Balance  Dynamic Standing-Balance Support: Bilateral upper extremity supported  Dynamic Standing-Comments: With FWW support  Functional Assessments:  Bed Mobility  Bed Mobility: Yes  Bed Mobility 1  Bed Mobility 1: Supine to sitting  Level of Assistance 1: Minimum assistance  Bed Mobility Comments 1: HOB elevated. PT able to progress the LLE toward/off the EOB. Assist provided to progress the RLE off the EOB. Pt used bed rail to assist trunk into upright sitting  Bed Mobility 2  Bed Mobility  2: Sitting to supine  Level of Assistance 2: Minimum assistance  Bed Mobility Comments 2: HOB flat. Assist with RLE lift into bed. Pt  able to lift LLE into bed with fair trunk control on descent to the bed.    Stairs  Stairs: No  Extremity/Trunk Assessments:  RLE   RLE : Exceptions to WFL  Strength RLE  RLE Overall Strength: Deficits, Due to pain  LLE   LLE : Exceptions to WFL  Strength LLE  LLE Overall Strength: Greater than or equal to 3/5 as evidenced by functional mobility  Treatments:  Therapeutic Exercise  Therapeutic Exercise Performed: Yes  Therapeutic Exercise Activity 1: Pt performed: 1x15 AP, 1x15 QS, 1x10 HS, 1x15 SAQ, 1x15 hip abd, and 1x15 LAQ. VC/TC provided to improve obdy mechanics and maximize benefit of each exercise. repetitions varied based on pain response from pt.    Ambulation/Gait Training  Ambulation/Gait Training Performed: Yes  Ambulation/Gait Training 1  Surface 1: Level tile  Device 1: Rolling walker  Gait Support Devices: Gait belt  Assistance 1: Contact guard, Minimal verbal cues  Comments/Distance (ft) 1: 12 ft x 2. Decreased step length with step-to pattern. VC for walker progression and sequencing steps leading with the RLE. VC to refrain from pivoting on surgical LE during ambulation. Pt reports dizziness on second trial.  Transfers  Transfer: Yes  Transfer 1  Transfer From 1: Bed to  Transfer to 1: Stand  Technique 1: Sit to stand  Transfer Device 1: Walker, Gait belt  Transfer Level of Assistance 1: Minimum assistance, Minimal verbal cues  Trials/Comments 1: x2 trials. VC for BUE push from bed to stand instead of pulling from the walker. Pt in mild flexion at hips and trunk in standing.  Transfers 2  Transfer From 2: Stand to  Transfer to 2: Bed  Technique 2: Stand to sit  Transfer Device 2: Walker (Gait belt)  Transfer Level of Assistance 2: Contact guard, Minimal verbal cues  Trials/Comments 2: x2 trials. VC for BLE positioning against bed before attempting to sit. VC for BUE reach for bed when sitting. Fair eccentric control on descent to bed.  Transfers 3  Transfer From 3: Stand to  Transfer to 3:  Toilet  Technique 3: Stand to sit  Transfer Device 3: Walker (Gait belt)  Transfer Level of Assistance 3: Minimal verbal cues, Minimum assistance  Trials/Comments 3: Assist with walker management and positioning when sitting. VC to use grab bars near toilet to assist in decent to low toilet seat.  Transfers 4  Transfer From 4: Toilet to  Transfer to 4: Stand  Technique 4: Sit to stand  Transfer Device 4: Walker (Gait belt)  Transfer Level of Assistance 4: Minimum assistance  Trials/Comments 4: Pt used grab bars near toilet to stand. No overt LOB.  Outcome Measures:  [facility] Basic Mobility  Turning from your back to your side while in a flat bed without using bedrails: A little  Moving from lying on your back to sitting on the side of a flat bed without using bedrails: A little  Moving to and from bed to chair (including a wheelchair): A little  Standing up from a chair using your arms (e.g. wheelchair or bedside chair): A little  To walk in hospital room: A little  Climbing 3-5 steps with railing: A lot  Basic Mobility - Total Score: 17    Encounter Problems       Encounter Problems (Active)       Balance       Goal 1 (Progressing)       Start:  04/18/24    Expected End:  05/02/24       Pt performs all sitting balance with supervision and standing balance with CGA using LRAD            Mobility       STG - Patient will ambulate up and down a curb/step with CGA and LRAD (Not Progressing)       Start:  04/18/24    Expected End:  05/02/24               Mobility       STG - Patient will ambulate (Progressing)       Start:  04/18/24    Expected End:  05/02/24       110 ft with CGA using LRAD            PT Problem       PT Goal 1 (Progressing)       Start:  04/18/24    Expected End:  05/02/24       Pt demonstrates IND in performing 2 sets of 10 reps of prescribed LE HEP per ANNA protocol            PT Transfers       STG - Patient to transfer to and from sit to supine (Progressing)       Start:  04/18/24    Expected End:   05/02/24       With close supervision         STG - Patient will transfer sit to and from stand (Progressing)       Start:  04/18/24    Expected End:  05/02/24       With close supervision using LRAD              Education Documentation  Handouts, taught by Brian Daniel PT at 4/18/2024  3:51 PM.  Learner: Patient  Readiness: Acceptance  Method: Explanation, Demonstration, Handout  Response: Demonstrated Understanding    Precautions, taught by Brian Daniel PT at 4/18/2024  3:51 PM.  Learner: Patient  Readiness: Acceptance  Method: Explanation, Demonstration, Handout  Response: Demonstrated Understanding    Body Mechanics, taught by Brian Daniel PT at 4/18/2024  3:51 PM.  Learner: Patient  Readiness: Acceptance  Method: Explanation, Demonstration, Handout  Response: Demonstrated Understanding    Home Exercise Program, taught by Brian Daniel PT at 4/18/2024  3:51 PM.  Learner: Patient  Readiness: Acceptance  Method: Explanation, Demonstration, Handout  Response: Demonstrated Understanding    Mobility Training, taught by Brian Daniel PT at 4/18/2024  3:51 PM.  Learner: Patient  Readiness: Acceptance  Method: Explanation, Demonstration, Handout  Response: Demonstrated Understanding    Education Comments  No comments found.

## 2024-04-18 NOTE — CARE PLAN
The patient's goals for the shift include      The clinical goals for the shift include pain relief through shift    Problem: Psychosocial Needs  Goal: Collaborate with me, my family, and caregiver to identify my specific goals  Recent Flowsheet Documentation  Taken 4/18/2024 1400 by Carola Tian RN  Cultural Requests During Hospitalization: n/a  Spiritual Requests During Hospitalization: n/a

## 2024-04-18 NOTE — ANESTHESIA PROCEDURE NOTES
Spinal Block    Patient location during procedure: OR  Start time: 4/18/2024 9:10 AM  End time: 4/18/2024 9:25 AM  Reason for block: primary anesthetic and at surgeon's request  Staffing  Performed: attending   Authorized by: Anuel Maya MD    Performed by: Anuel Maya MD    Preanesthetic Checklist  Completed: patient identified, IV checked, risks and benefits discussed, surgical consent, pre-op evaluation, timeout performed and sterile techniques followed  Block Timeout  RN/Licensed healthcare professional reads aloud to the Anesthesia provider and entire team: Patient identity, procedure with side and site, patient position, and as applicable the availability of implants/special equipment/special requirements.  Patient on coagulant treatment: no  Timeout performed at: 4/18/2024 9:10 AM  Spinal Block  Patient position: sitting  Prep: Betadine  Sterility prep: cap, drape, gloves, hand hygiene, gown and mask  Patient monitoring: blood pressure, continuous pulse oximetry and EKG  Approach: midline  Vertebral space: L3-4  Injection technique: single-shot  Needle  Needle type: pencan.   Needle gauge: 24 G (with introducer)  Free flowing CSF: yes    Assessment  Procedure assessment: patient tolerated procedure well with no immediate complications

## 2024-04-18 NOTE — CONSULTS
Wound Care Consult     Visit Date: 4/18/2024      Patient Name: Lisa Brewster         MRN: [id number]           YOB: 1955     Reason for Consult: Due to alarming prevena with leak alarm blinking.Canister changed, and  dressing reinforced, unable to achieve seal. Prevena machine changed to prevena plus 125. Seal achieved.  Co-visit with Josie DALTON, RN, CWOCN      Sharyn Salas RN BSN,WCC,CWOCN  [phone]   4/18/2024  4:10 PM

## 2024-04-18 NOTE — BRIEF OP NOTE
Date: 2024  OR Location: [facility] OR    Name: Lisa Brewster, : 1955, Age: 68 y.o., MRN: [id number], Sex: female    Diagnosis  Pre-op Diagnosis     * Primary osteoarthritis of right hip [M16.11] Post-op Diagnosis     * Primary osteoarthritis of right hip [M16.11]     Procedures  Right Hip Total Arthroplasty  06183 - CT ARTHRP ACETBLR/PROX FEM PROSTC AGRFT/ALGRFT      Surgeons      * Jair Dill - Primary    Resident/Fellow/Other Assistant:  Surgeons and Role:  * No surgeons found with a matching role *    Procedure Summary  Anesthesia: Spinal  ASA: ASA status not filed in the log.  Anesthesia Staff: Anesthesiologist: Anuel Maya MD  CRNA: PAUL Gill-CRNA  Estimated Blood Loss: 200mL  Intra-op Medications:   Administrations occurring from 0730 to 1030 on 24:   Medication Name Total Dose   sodium chloride 0.9 % irrigation solution 4,000 mL   lactated Ringer's infusion Cannot be calculated              Anesthesia Record               Intraprocedure I/O Totals          Intake    Tranexamic Acid 0.00 mL    The total shown is the total volume documented since Anesthesia Start was filed.    Propofol Drip 0.00 mL    The total shown is the total volume documented since Anesthesia Start was filed.    lactated Ringer's infusion 2000.00 mL    Total Intake 2000 mL       Output    Est. Blood Loss 350 mL    Total Output 350 mL       Net    Net Volume 1650 mL          Specimen: No specimens collected     Staff:   Circulator: Cinthia Salinas RN  Relief Scrub: Gauri Fam  Scrub Person: Jake Nava; Power Dunn; Tona Hummel          Findings: see operative report    Complications:  None; patient tolerated the procedure well.     Disposition: PACU - hemodynamically stable.  Condition: stable  Specimens Collected: No specimens collected

## 2024-04-18 NOTE — OP NOTE
Right Hip Total Arthroplasty (R) Operative Note     Date: 2024  OR Location: [facility] OR    Name: Lisa Brewster, : 1955, Age: 68 y.o., MRN: [id number], Sex: female    Diagnosis  Pre-op Diagnosis     * Primary osteoarthritis of right hip [M16.11] Post-op Diagnosis     * Primary osteoarthritis of right hip [M16.11]     Procedures  Right Hip Total Arthroplasty  70856 - [place] ARTHRP ACETBLR/PROX FEM PROSTC AGRFT/ALGRFT      Surgeons      * Jair Dill - Primary    Resident/Fellow/Other Assistant:  Surgeons and Role:  * No surgeons found with a matching role *    Procedure Summary  Anesthesia: Spinal  ASA: II  Anesthesia Staff: Anesthesiologist: Anuel Maya MD  CRNA: PAUL Gill-CRNA  Estimated Blood Loss: 300mL  Intra-op Medications:   Administrations occurring from 0730 to 1030 on 24:   Medication Name Total Dose   sodium chloride 0.9 % irrigation solution 4,000 mL   lactated Ringer's infusion Cannot be calculated              Anesthesia Record               Intraprocedure I/O Totals          Intake    Tranexamic Acid 0.00 mL    The total shown is the total volume documented since Anesthesia Start was filed.    Propofol Drip 0.00 mL    The total shown is the total volume documented since Anesthesia Start was filed.    lactated Ringer's infusion 2000.00 mL    Total Intake 2000 mL       Output    Est. Blood Loss 350 mL    Total Output 350 mL       Net    Net Volume 1650 mL          Specimen: No specimens collected     Staff:   Circulator: Cinthia Salinas RN  Relief Scrub: Gauri Fam  Scrub Person: Jake Nava; Power Dunn; Tona Hummel         Drains and/or Catheters: * None in log *    Tourniquet Times:         Implants:  Implants       Type Name Action Serial No.      Joint Hip ACETABULAR CUP, SECTOR, GRIPTON, SIZE 52MM - SN/A - [id number] Implanted N/A     Joint Hip LINER, ALTRX, NEURTAL, 36 X 52MM - [id number] Implanted      Screw SCREW CANCELLOUS 6.5 X 30 -  [id number] Implanted      Screw SCREW CANCELLOUS 6.5 X 25 - [id number] Implanted      Joint Hip FEMORAL HEAD, CERAMIC 36 +5 - [id number] Implanted      Joint Hip STEM, FEMUR TRILOCK, BPS, SZ 4, HI OFFSET - [id number] Implanted               Findings: DJD right hip. Morbid obesity    Implants:  Depuy [place] Three Hole Acetabular Shell 52 OD   Depuy Trilock Hi offset, size 4  ALTRX Neutral 36 mm ID   Biolox Delta Ceramic 36 OD, +5, 12/14 taper   Bone screws x 2 (25 and 30 mm)      Complications:  None.    Position: Lateral Decubitus    Medications: Ancef, TXA    History and indications:  The patient is a 68 y.o. y.o. female with endstage DJD of the hip. The patient has failed conservative measures and has elected to proceed with total hip arthroplasty. We discussed in detail the risks the benefits and the alternatives to surgery. The patient understands the risks would include but are not limited to infection, fracture, dislocation, stiffness, leg length inequality, chronic pain, disability, wear, loosening, reaction to implanted materials, DVT, PE, MI, stroke, loss of limb, loss of life, or potential need for further surgery. The patient understands these risks and has elected to proceed.      Procedure:  The patient was properly identified in the holding area using name, medical record number, and date of birth. Informed consent was then signed and the operative extremity was marked. Next, the patient was brought back to the operating room. Spinal anesthesia was initiated without difficulty. The patient was placed in a well-padded lateral decubitus position on the table, all bony prominences were well padded, and the operative lower extremity was prepped and draped in normal sterile fashion. The patient did receive pre-operative antibiotics.  A preprocedural timeout was then performed once again confirming the patient's correct identity, correct procedure, correct side, and correct site. In addition, allergy status  and required equipment were reviewed. Three independent members of the operating room team agreed on the above-mentioned parameters.      A skin incision was made centered over the greater trochanter. Electrocautery was used to coagulate vessels. Dissection was then carried down to the level of the fascia. The fascia was then incised along its axis. Charnley retractors were placed. The leg was internally rotated. The piriformis tendon was identified and dissected free from its insertion and then tagged. The remainder of the short external rotators and the capsule was removed in a single layer and tagged with #5 Ethibond suture.  Once the capsule was sufficiently released, the hip was dislocated. Dissection was carefully carried down to the level of the lesser trochanter. Retractors were carefully placed around the neck. The neck was cut at the templated length. The neck cut was measured off of the level of the lesser trochanter to correspond to the templated neck cut. An oscillating saw was used to perform the neck cut, and the head was placed on the back table.       Acetabular retractors were carefully placed around the acetabulum. A labrectomy was performed. The soft tissue was removed from the cotyloid fossa. Reaming was then carried out first to medialize, and then in the appropriate direction until appropriate fit was obtained. The acetabulum was irrigated. The final acetabular component was impacted into place in the appropriate position. There was a small gap between the implant and native bone. It was packed with autologous bone graft.  Screws were used for additional fixation. The acetabular liner was impacted in place. Impinging structures anteriorly and osteophytes were subsequently then removed.      Attention was then turned towards the femur. Soft tissue was removed from the lateral aspect of the neck cut. A box osteotome and canal finder were used to gain access to the canal. A lateralizing rasp was  used to ensure that I was lateral. Sequential broaching was then carried up to a size 4. This was found to be rotationally stable. Trial head and neck were placed on the broach. The hip was reduced and then taken through a range of motion. The leg lengths appeared equal, the hip had appropriate soft tissue tension, and appropriate range of motion. An intra-operative x-ray was obtained and reviewed. At this time the trial femoral component was removed. The canal was irrigated. The final components were then impacted into place. The hip was then reduced. A primary capsular repair was performed. The fascia was closed primarily. Tissues were then injected with a local anesthetic. The skin was then closed in multiple layers with the superficial layer closed with Monocryl and skin glue. The hip was dressed sterilely.  I elected to utilize an incisional wound VAC given the patient's morbid obesity and lymphedema.  I believe it is medically necessary and indicated to use an incisional wound VAC to allow for proper wound healing.  The patient was taken to the recovery room in stable condition.      I attest that I was present and scrubbed for all key portions of this case. I supervised the entire case.     Complications:  None; patient tolerated the procedure well.    Disposition: PACU - hemodynamically stable.  Condition: stable         Additional Details: NA    Attending Attestation: I was present and scrubbed for the key portions of the procedure.    Jair Dill  Phone Number: [phone]

## 2024-04-19 ENCOUNTER — DOCUMENTATION (OUTPATIENT)
Dept: HOME HEALTH SERVICES | Facility: HOME HEALTH | Age: 69
End: 2024-04-19

## 2024-04-19 ENCOUNTER — PHARMACY VISIT (OUTPATIENT)
Dept: PHARMACY | Facility: CLINIC | Age: 69
End: 2024-04-19
Payer: COMMERCIAL

## 2024-04-19 VITALS
WEIGHT: 234.57 LBS | OXYGEN SATURATION: 97 % | DIASTOLIC BLOOD PRESSURE: 66 MMHG | SYSTOLIC BLOOD PRESSURE: 104 MMHG | RESPIRATION RATE: 16 BRPM | BODY MASS INDEX: 39.08 KG/M2 | HEART RATE: 77 BPM | HEIGHT: 65 IN | TEMPERATURE: 97.3 F

## 2024-04-19 LAB
ANION GAP SERPL CALC-SCNC: 11 MMOL/L (ref 10–20)
BUN SERPL-MCNC: 16 MG/DL (ref 6–23)
CALCIUM SERPL-MCNC: 7.6 MG/DL (ref 8.6–10.3)
CHLORIDE SERPL-SCNC: 101 MMOL/L (ref 98–107)
CO2 SERPL-SCNC: 25 MMOL/L (ref 21–32)
CREAT SERPL-MCNC: 0.55 MG/DL (ref 0.5–1.05)
EGFRCR SERPLBLD CKD-EPI 2021: >90 ML/MIN/1.73M*2
ERYTHROCYTE [DISTWIDTH] IN BLOOD BY AUTOMATED COUNT: 13.8 % (ref 11.5–14.5)
GLUCOSE SERPL-MCNC: 123 MG/DL (ref 74–99)
HCT VFR BLD AUTO: 24.8 % (ref 36–46)
HGB BLD-MCNC: 8.2 G/DL (ref 12–16)
MCH RBC QN AUTO: 30.4 PG (ref 26–34)
MCHC RBC AUTO-ENTMCNC: 33.1 G/DL (ref 32–36)
MCV RBC AUTO: 92 FL (ref 80–100)
NRBC BLD-RTO: 0 /100 WBCS (ref 0–0)
PLATELET # BLD AUTO: 244 X10*3/UL (ref 150–450)
POTASSIUM SERPL-SCNC: 4.3 MMOL/L (ref 3.5–5.3)
RBC # BLD AUTO: 2.7 X10*6/UL (ref 4–5.2)
SODIUM SERPL-SCNC: 133 MMOL/L (ref 136–145)
WBC # BLD AUTO: 9.2 X10*3/UL (ref 4.4–11.3)

## 2024-04-19 PROCEDURE — 97110 THERAPEUTIC EXERCISES: CPT | Mod: GP,CQ

## 2024-04-19 PROCEDURE — 97116 GAIT TRAINING THERAPY: CPT | Mod: GP,CQ

## 2024-04-19 PROCEDURE — 85027 COMPLETE CBC AUTOMATED: CPT

## 2024-04-19 PROCEDURE — 80048 BASIC METABOLIC PNL TOTAL CA: CPT

## 2024-04-19 PROCEDURE — 97535 SELF CARE MNGMENT TRAINING: CPT | Mod: GO

## 2024-04-19 PROCEDURE — 2500000001 HC RX 250 WO HCPCS SELF ADMINISTERED DRUGS (ALT 637 FOR MEDICARE OP)

## 2024-04-19 PROCEDURE — 99232 SBSQ HOSP IP/OBS MODERATE 35: CPT | Performed by: NURSE PRACTITIONER

## 2024-04-19 PROCEDURE — G0378 HOSPITAL OBSERVATION PER HR: HCPCS

## 2024-04-19 PROCEDURE — 97165 OT EVAL LOW COMPLEX 30 MIN: CPT | Mod: GO

## 2024-04-19 PROCEDURE — 36415 COLL VENOUS BLD VENIPUNCTURE: CPT

## 2024-04-19 PROCEDURE — 9420000001 HC RT PATIENT EDUCATION 5 MIN

## 2024-04-19 PROCEDURE — 7100000011 HC EXTENDED STAY RECOVERY HOURLY - NURSING UNIT

## 2024-04-19 PROCEDURE — 2500000004 HC RX 250 GENERAL PHARMACY W/ HCPCS (ALT 636 FOR OP/ED)

## 2024-04-19 RX ADMIN — OXYCODONE HYDROCHLORIDE 5 MG: 5 TABLET ORAL at 02:00

## 2024-04-19 RX ADMIN — ASPIRIN 81 MG: 81 TABLET, COATED ORAL at 08:19

## 2024-04-19 RX ADMIN — PANTOPRAZOLE SODIUM 40 MG: 40 TABLET, DELAYED RELEASE ORAL at 06:29

## 2024-04-19 RX ADMIN — CEFAZOLIN SODIUM 2 G: 2 INJECTION, SOLUTION INTRAVENOUS at 02:00

## 2024-04-19 RX ADMIN — DOCUSATE SODIUM 100 MG: 100 CAPSULE, LIQUID FILLED ORAL at 08:20

## 2024-04-19 RX ADMIN — FERROUS SULFATE TAB 325 MG (65 MG ELEMENTAL FE) 1 TABLET: 325 (65 FE) TAB at 08:19

## 2024-04-19 RX ADMIN — OXYCODONE HYDROCHLORIDE 10 MG: 5 TABLET ORAL at 08:20

## 2024-04-19 RX ADMIN — POLYETHYLENE GLYCOL 3350 17 G: 17 POWDER, FOR SOLUTION ORAL at 08:19

## 2024-04-19 RX ADMIN — TRAMADOL HYDROCHLORIDE 50 MG: 50 TABLET, COATED ORAL at 12:26

## 2024-04-19 RX ADMIN — KETOROLAC TROMETHAMINE 15 MG: 30 INJECTION, SOLUTION INTRAMUSCULAR at 06:30

## 2024-04-19 RX ADMIN — ACETAMINOPHEN 975 MG: 325 TABLET ORAL at 06:30

## 2024-04-19 SDOH — ECONOMIC STABILITY: HOUSING INSECURITY
IN THE LAST 12 MONTHS, WAS THERE A TIME WHEN YOU DID NOT HAVE A STEADY PLACE TO SLEEP OR SLEPT IN A SHELTER (INCLUDING NOW)?: NO

## 2024-04-19 SDOH — HEALTH STABILITY: MENTAL HEALTH: HOW OFTEN DO YOU HAVE A DRINK CONTAINING ALCOHOL?: MONTHLY OR LESS

## 2024-04-19 SDOH — ECONOMIC STABILITY: INCOME INSECURITY: IN THE LAST 12 MONTHS, WAS THERE A TIME WHEN YOU WERE NOT ABLE TO PAY THE MORTGAGE OR RENT ON TIME?: NO

## 2024-04-19 SDOH — SOCIAL STABILITY: SOCIAL NETWORK: ARE YOU MARRIED, WIDOWED, DIVORCED, SEPARATED, NEVER MARRIED, OR LIVING WITH A PARTNER?: MARRIED

## 2024-04-19 SDOH — HEALTH STABILITY: MENTAL HEALTH: HOW MANY STANDARD DRINKS CONTAINING ALCOHOL DO YOU HAVE ON A TYPICAL DAY?: 1 OR 2

## 2024-04-19 SDOH — ECONOMIC STABILITY: INCOME INSECURITY: IN THE PAST 12 MONTHS, HAS THE ELECTRIC, GAS, OIL, OR WATER COMPANY THREATENED TO SHUT OFF SERVICE IN YOUR HOME?: NO

## 2024-04-19 SDOH — ECONOMIC STABILITY: FOOD INSECURITY: WITHIN THE PAST 12 MONTHS, THE FOOD YOU BOUGHT JUST DIDN'T LAST AND YOU DIDN'T HAVE MONEY TO GET MORE.: NEVER TRUE

## 2024-04-19 SDOH — ECONOMIC STABILITY: TRANSPORTATION INSECURITY
IN THE PAST 12 MONTHS, HAS LACK OF TRANSPORTATION KEPT YOU FROM MEETINGS, WORK, OR FROM GETTING THINGS NEEDED FOR DAILY LIVING?: NO

## 2024-04-19 SDOH — HEALTH STABILITY: MENTAL HEALTH: HOW OFTEN DO YOU HAVE 6 OR MORE DRINKS ON ONE OCCASION?: NEVER

## 2024-04-19 SDOH — ECONOMIC STABILITY: TRANSPORTATION INSECURITY
IN THE PAST 12 MONTHS, HAS THE LACK OF TRANSPORTATION KEPT YOU FROM MEDICAL APPOINTMENTS OR FROM GETTING MEDICATIONS?: NO

## 2024-04-19 SDOH — ECONOMIC STABILITY: FOOD INSECURITY: WITHIN THE PAST 12 MONTHS, YOU WORRIED THAT YOUR FOOD WOULD RUN OUT BEFORE YOU GOT MONEY TO BUY MORE.: NEVER TRUE

## 2024-04-19 SDOH — ECONOMIC STABILITY: HOUSING INSECURITY: IN THE LAST 12 MONTHS, HOW MANY PLACES HAVE YOU LIVED?: 1

## 2024-04-19 SDOH — ECONOMIC STABILITY: INCOME INSECURITY: HOW HARD IS IT FOR YOU TO PAY FOR THE VERY BASICS LIKE FOOD, HOUSING, MEDICAL CARE, AND HEATING?: NOT HARD AT ALL

## 2024-04-19 ASSESSMENT — COGNITIVE AND FUNCTIONAL STATUS - GENERAL
PERSONAL GROOMING: A LITTLE
MOVING TO AND FROM BED TO CHAIR: A LITTLE
STANDING UP FROM CHAIR USING ARMS: A LITTLE
MOBILITY SCORE: 18
HELP NEEDED FOR BATHING: A LITTLE
DRESSING REGULAR LOWER BODY CLOTHING: A LOT
DAILY ACTIVITIY SCORE: 22
MOVING TO AND FROM BED TO CHAIR: A LITTLE
DRESSING REGULAR UPPER BODY CLOTHING: A LITTLE
TURNING FROM BACK TO SIDE WHILE IN FLAT BAD: A LITTLE
DAILY ACTIVITIY SCORE: 17
STANDING UP FROM CHAIR USING ARMS: A LITTLE
DRESSING REGULAR LOWER BODY CLOTHING: A LITTLE
HELP NEEDED FOR BATHING: A LITTLE
EATING MEALS: A LITTLE
CLIMB 3 TO 5 STEPS WITH RAILING: A LITTLE
CLIMB 3 TO 5 STEPS WITH RAILING: A LITTLE
WALKING IN HOSPITAL ROOM: A LITTLE
TURNING FROM BACK TO SIDE WHILE IN FLAT BAD: A LITTLE
MOVING FROM LYING ON BACK TO SITTING ON SIDE OF FLAT BED WITH BEDRAILS: A LITTLE
WALKING IN HOSPITAL ROOM: A LITTLE
TOILETING: A LITTLE
MOBILITY SCORE: 18
MOVING FROM LYING ON BACK TO SITTING ON SIDE OF FLAT BED WITH BEDRAILS: A LITTLE

## 2024-04-19 ASSESSMENT — PAIN SCALES - GENERAL
PAINLEVEL_OUTOF10: 4
PAINLEVEL_OUTOF10: 3
PAINLEVEL_OUTOF10: 6
PAINLEVEL_OUTOF10: 6
PAINLEVEL_OUTOF10: 0 - NO PAIN
PAINLEVEL_OUTOF10: 7
PAINLEVEL_OUTOF10: 3

## 2024-04-19 ASSESSMENT — PAIN DESCRIPTION - DESCRIPTORS: DESCRIPTORS: ACHING

## 2024-04-19 ASSESSMENT — PAIN - FUNCTIONAL ASSESSMENT
PAIN_FUNCTIONAL_ASSESSMENT: 0-10

## 2024-04-19 ASSESSMENT — ACTIVITIES OF DAILY LIVING (ADL)
BATHING_ASSISTANCE: STAND BY
HOME_MANAGEMENT_TIME_ENTRY: 20
ADL_ASSISTANCE: INDEPENDENT

## 2024-04-19 ASSESSMENT — LIFESTYLE VARIABLES
SKIP TO QUESTIONS 9-10: 1
AUDIT-C TOTAL SCORE: 1

## 2024-04-19 ASSESSMENT — PAIN DESCRIPTION - LOCATION: LOCATION: HIP

## 2024-04-19 ASSESSMENT — PAIN DESCRIPTION - ORIENTATION: ORIENTATION: RIGHT

## 2024-04-19 NOTE — PROGRESS NOTES
Occupational Therapy    Evaluation/Treatment    Patient Name: Lisa Brewster  MRN: [id number]  : 1955  Today's Date: 24  Time Calculation  Start Time: 1124  Stop Time: 1159  Time Calculation (min): 35 min       Assessment:  OT Assessment: Pt seen for OT eval and tx. Pt requires some assist with mobility and ADLS. Pt would bebenfit from low intensity therapy at d/c  Prognosis: Excellent  Barriers to Discharge: None  Evaluation/Treatment Tolerance: Patient tolerated treatment well  Medical Staff Made Aware: Yes  End of Session Communication: Bedside nurse  End of Session Patient Position: Up in chair, Alarm off, caregiver present  OT Assessment Results: Decreased ADL status, Decreased functional mobility  Prognosis: Excellent  Barriers to Discharge: None  Evaluation/Treatment Tolerance: Patient tolerated treatment well  Medical Staff Made Aware: Yes  Strengths: Ability to acquire knowledge, Access to adaptive/assistive products, Attitude of self, Coping skills, Living arrangement secure, Support of Caregivers, Support of extended family/friends  Plan:  Treatment Interventions: ADL retraining, Endurance training, Functional transfer training, Equipment evaluation/education  OT Frequency: 2 times per week  OT Discharge Recommendations: Low intensity level of continued care  Equipment Recommended upon Discharge: Wheeled walker  OT Recommended Transfer Status:  (supervision)  OT - OK to Discharge: Yes  Treatment Interventions: ADL retraining, Endurance training, Functional transfer training, Equipment evaluation/education    Subjective   Current Problem:  1. Primary osteoarthritis of right hip  oxyCODONE (Roxicodone) 5 mg immediate release tablet    traMADol (Ultram) 50 mg tablet    sennosides (Senokot) 8.6 mg tablet    acetaminophen (Tylenol) 500 mg tablet    aspirin 81 mg chewable tablet    cefadroxil (Duricef) 500 mg capsule    pantoprazole (ProtoNix) 40 mg EC tablet    ondansetron (Zofran) 4 mg tablet     docusate sodium (Colace) 100 mg capsule    Referral to Home Health    CANCELED: Referral to Home Health        General:   OT Received On: 04/19/24  General  Reason for Referral: 67 y/o F s/p R ANNA on 4/18/24  Referred By: VICTOR HUGO Dill  Past Medical History Relevant to Rehab:   Past Medical History:   Diagnosis Date    Awareness under anesthesia     knee surgery    Chronic low back pain     s/p injections    Delayed emergence from general anesthesia     during toe surgery    Hypertension     Hyponatremia     Na 134 - 3/18/24    Hypothyroidism     s/p thyroidectomy d/t hyperthyroidism - benign    Obesity (BMI 30-39.9)     3/18/24: BMI 39.05    Osteoarthritis     Venous [place]        Prior to Session Communication: Bedside nurse  Patient Position Received: Bed, 3 rail up, Alarm on  Preferred Learning Style: auditory, verbal, visual, written  General Comment: Pt in bed agreeable to OT eval and tx  Precautions:  LE Weight Bearing Status:  (FWB RLE)  Medical Precautions: Fall precautions  Post-Surgical Precautions: Right hip precautions  Pain:  Pain Assessment  Pain Assessment: 0-10  Pain Score: 3  Pain Type: Surgical pain  Pain Location: Hip  Pain Orientation: Right  Pain Descriptors: Aching    Objective   Cognition:  Overall Cognitive Status: Within Functional Limits  Orientation Level: Oriented X4  Following Commands: Follows all commands and directions without difficulty  Attention: Within Functional Limits  Memory: Within Funtional Limits  Insight: Within function limits  Impulsive: Within functional limits           Home Living:  Type of Home: House  Lives With: Spouse  Home Adaptive Equipment: Walker rolling or standard  Home Layout: Two level, Laundry in basement  Alternate Level Stairs-Rails: Left  Alternate Level Stairs-Number of Steps: 16  Home Access: Stairs to enter without rails  Entrance Stairs-Rails: None  Entrance Stairs-Number of Steps: 1  Bathroom Shower/Tub: Tub/shower unit, Walk-in shower  Bathroom  Toilet: Standard, Adaptive toilet seating  Bathroom Equipment: Grab bars in shower, Built-in shower seat  Prior Function:  Level of [place]: Independent with ADLs and functional transfers, Needs assistance with ADLs  Receives Help From: Family  ADL Assistance: Independent  Ambulatory Assistance: Independent  Vocational: Full time employment ([employer])  Hand Dominance: Right  IADL History:  Homemaking Responsibilities: Yes  Meal Prep Responsibility: Primary  Laundry Responsibility: Primary  Cleaning Responsibility: Primary  Bill Paying/Finance Responsibility: Secondary  Shopping Responsibility: Primary  Current License: Yes  Mode of Transportation: Car  Occupation: Full time employment  Type of Occupation: management for [employer]  ADL:  Eating Assistance: Independent  Grooming Assistance: Independent  Bathing Assistance: Stand by  Bathing Deficit: Setup, Verbal cueing  UE Dressing Assistance: Independent  LE Dressing Assistance: Stand by  LE Dressing Deficit: Setup, Verbal cueing, Don/doff R sock, Don/doff L sock, Thread RLE into pants, Thread LLE into pants, Thread RLE into underwear, Thread LLE into underwear, Use of adaptive equipment  Activities of Daily Living: LE Dressing  LE Dressing: Yes  LE Dressing Adaptive Equipment: Reacher, Sock aide, Shoe horn, Dressing stick  Pants Level of Assistance: Modified independent, Minimal verbal cues  Sock Level of Assistance: Minimum assistance, Minimal verbal cues  Shoe Level of Assistance: Modified independent, Minimal verbal cues  LE Dressing Where Assessed: Edge of bed  LE Dressing Comments: pt reports she has had surgery in the past is familiar with techniques and has equipment. Requiired some refresher on use of equipment  Activity Tolerance:  Endurance: Endurance does not limit participation in activity  Functional Standing Tolerance:     Bed Mobility/Transfers: Bed Mobility  Bed Mobility: Yes  Bed Mobility 1  Bed Mobility 1: Supine to sitting  Level of Assistance  1: Close supervision  Bed Mobility Comments 1: HOB slightly    Transfers  Transfer: Yes  Transfer 1  Technique 1: Sit to stand, Stand to sit  Transfer Device 1: Walker  Transfer Level of Assistance 1: Distant supervision    Car Transfers  Car Transfers Comments: Pt istructed in and given handout on car transfers      Functional Mobility:  Functional Mobility  Functional Mobility Performed: Yes  Functional Mobility 1  Surface 1: Level tile  Device 1: Rolling walker  Assistance 1: Close supervision  Comments 1: VCs for walkers placement as pt tensd to keep walker farther away from her  Sitting Balance:  Static Sitting Balance  Static Sitting-Balance Support: Feet supported  Static Sitting-Level of Assistance: Independent  Dynamic Sitting Balance  Dynamic Sitting-Balance Support: Feet supported  Dynamic Sitting-Balance: Reaching for objects, Reaching across midline  Dynamic Sitting-Comments: independent     Therapy/Activity: Therapeutic Activity  Therapeutic Activity Performed: Yes  Therapeutic Activity 1: Pt instructed in car transfers, precautions, energyconservation and use of and given opportunity to practice with adaptive LE equipment for adls       Vision:Vision - Basic Assessment  Current Vision: No visual deficits  Sensation:  Light Touch: No apparent deficits  Sharp/Dull: No apparent deficits  Sensation Comment: no apparent deficits B UE  Strength:  Strength Comments: B UE WFL  Other Activity:     Home Environment:     Perception:     Coordination:  Movements are Fluid and Coordinated: Yes  Finger to Nose: Intact  Rapid Alternating Movements: Intact  Finger to Target: Intact  Coordination Comment: intact   Hand Function:  Hand Function  Gross Grasp: Functional  Coordination: Functional  Extremities: RUE   RUE : Within Functional Limits and LUE   LUE: Within Functional Limits      Outcome Measures: [facility] Daily Activity  Putting on and taking off regular lower body clothing: A little  Bathing (including washing,  rinsing, drying): A little  Putting on and taking off regular upper body clothing: None  Toileting, which includes using toilet, bedpan or urinal: None  Taking care of personal grooming such as brushing teeth: None  Eating Meals: None  Daily Activity - Total Score: 22        Education Documentation  Handouts, taught by Isha Hutchinson OT at 4/19/2024  1:49 PM.  Learner: Patient  Readiness: Acceptance  Method: Explanation, Demonstration, Handout, Teach-back  Response: Verbalizes Understanding, Demonstrated Understanding, Needs Reinforcement    Precautions, taught by Isha Hutchinson OT at 4/19/2024  1:49 PM.  Learner: Patient  Readiness: Acceptance  Method: Explanation, Demonstration, Handout, Teach-back  Response: Verbalizes Understanding, Demonstrated Understanding, Needs Reinforcement    ADL Training, taught by Isha Hutchinson OT at 4/19/2024  1:49 PM.  Learner: Patient  Readiness: Acceptance  Method: Explanation, Demonstration, Handout, Teach-back  Response: Verbalizes Understanding, Demonstrated Understanding, Needs Reinforcement    Education Comments  No comments found.        OP EDUCATION:  Education  Individual(s) Educated: Patient  Education Provided: Diagnosis & Precautions, Joint protection and energy conservation, Fall precautons, Risk and benefits of OT discussed with patient or other, POC discussed and agreed upon  Risk and Benefits Discussed with Patient/Caregiver/Other: yes  Patient/Caregiver Demonstrated Understanding: yes  Plan of Care Discussed and Agreed Upon: yes  Patient Response to Education: Patient/Caregiver Verbalized Understanding of Information, Patient/Caregiver Performed Return Demonstration of Exercises/Activities, Patient/Caregiver Asked Appropriate Questions    Goals:  Encounter Problems       Encounter Problems (Active)       ADLs       Patient will perform LB bathing 100% with modified independent level of assistance and adaptive equipment prn . (Progressing)        Start:  04/19/24    Expected End:  04/26/24            Patient with complete lower body dressing with modified independent level of assistance donning and doffing all LE clothes  with PRN adaptive equipment while supported sitting (Progressing)       Start:  04/19/24    Expected End:  04/26/24               MOBILITY       Patient will perform Functional mobility max Household distances/Community Distances with modified independent level of assistance and front wheeled walker in order to improve safety and functional mobility. (Progressing)       Start:  04/19/24    Expected End:  04/26/24               TRANSFERS       Patient will complete functional transfer to chair  with front wheeled walker with modified independent level of assistance. (Progressing)       Start:  04/19/24    Expected End:  04/26/24

## 2024-04-19 NOTE — HH CARE COORDINATION
Home Care received a Referral for Physical Therapy. We have processed the referral for a Start of Care on 4/20/24.     If you have any questions or concerns, please feel free to contact us at [phone]. Follow the prompts, enter your five digit zip code, and you will be directed to your care team on [facility] 1.

## 2024-04-19 NOTE — PROGRESS NOTES
04/19/24 1132   Discharge Planning   Living Arrangements Spouse/significant other   Support Systems Spouse/significant other   Assistance Needed None   Type of Residence Private residence   Number of Stairs to Enter Residence 1   Number of Stairs Within Residence 0   Do you have animals or pets at home? Yes   Type of Animals or Pets one cat   Who is requesting discharge planning? Provider   Home or Post Acute Services In home services   Type of Home Care Services Home nursing visits;Home OT;Home PT   Patient expects to be discharged to: [facility]   Does the patient need discharge transport arranged? No   Financial Resource Strain   How hard is it for you to pay for the very basics like food, housing, medical care, and heating? Not hard   Housing Stability   In the last 12 months, was there a time when you were not able to pay the mortgage or rent on time? N   In the last 12 months, how many places have you lived? 1   In the last 12 months, was there a time when you did not have a steady place to sleep or slept in a shelter (including now)? N   Transportation Needs   In the past 12 months, has lack of transportation kept you from medical appointments or from getting medications? no   In the past 12 months, has lack of transportation kept you from meetings, work, or from getting things needed for daily living? No   Patient Choice   Provider Choice list and CMS website (https://medicare.gov/care-compare#search) for post-acute Quality and Resource Measure Data were provided and reviewed with: Patient   Patient / Family choosing to utilize agency / facility established prior to hospitalization No     4/19/24 1132  Met with patient at bedside to discuss discharge planning.  Patient lives in a house with her .  She will be able to stay on the first floor until she is able to go upstairs.  She is independent with ADLs and drives at baseline.  She only uses a cane occasionally.  She has a walker for discharge.  She  is part of the [facility] program.  She is staying at [facility] in Room 714.  She will have HHC with [facility].  SOC confirmed with [facility] (ANNELISE Kellogg) for tomorrow.   Deanna Yang RN TCC

## 2024-04-19 NOTE — NURSING NOTE
Interdisciplinary team present: NP, PT, NM, CC, SW, Orthopedic Coordinator, and bedside RN.  Pain - controlled  Nausea - none  Discharge barrier - n/a  Discharge plan - [facility] with [facility]  Discharge date/time - today after PT

## 2024-04-19 NOTE — CARE PLAN
Problem: Pain  Goal: My pain/discomfort is manageable  Outcome: Progressing  Flowsheets (Taken 4/18/2024 1959)  Resident's pain/discomfort is manageable:   Include resident/family/caregiver in decisions related to pain management   Administer pain medication prior to activities that may trigger pain   Offer non-pharmacological pain management interventions   Identify and avoid pain triggers     Problem: Safety  Goal: Patient will be injury free during hospitalization  Outcome: Progressing  Goal: I will remain free of falls  Outcome: Progressing  Flowsheets (Taken 4/18/2024 1959)  Resident will remain free of falls:   Maintain bed at position as ordered (chair height, low bed)   Assist with toileting as orderd   Assess and monitor medications that may increase fall risk   Visual checks per facility policy   Consult with physical therapy as needed     Problem: Daily Care  Goal: Daily care needs are met  Outcome: Progressing  Flowsheets (Taken 4/18/2024 1959)  Daily care needs are met:   Assess and monitor ability to perform self care and identify potential discharge needs   Assist patient with activities of daily living as needed   Encourage independent activity per ability   Include patient/family/caregiver in decisions related to daily care   The patient's goals for the shift include      The clinical goals for the shift include pain relief through shift

## 2024-04-19 NOTE — PROGRESS NOTES
Physical Therapy    Physical Therapy Treatment    Patient Name: Lisa Brewster  MRN: [id number]  Today's Date: 4/19/2024  Time Calculation  Start Time: 1013  Stop Time: 1052  Time Calculation (min): 39 min       Assessment/Plan   PT Assessment  End of Session Communication: Bedside nurse  End of Session Patient Position: Alarm on, Bed, 3 rail up  PT Plan  Inpatient/Swing Bed or Outpatient: Inpatient  PT Plan  Treatment/Interventions: Bed mobility, Transfer training, Gait training, Stair training  PT Plan: Skilled PT  PT Frequency: BID  PT Discharge Recommendations: Low intensity level of continued care  Equipment Recommended upon Discharge: Wheeled walker (Pt owns a FWW)  PT Recommended Transfer Status: Stand by assist  PT - OK to Discharge: Yes (per POC)      General Visit Information:   PT  Visit  PT Received On: 04/19/24  General  Reason for Referral: 69 y/o F s/p R ANNA on 4/18/24  Referred By: VICTOR HUGO Dill  Prior to Session Communication: Bedside nurse  Patient Position Received: Bed, 3 rail up, Alarm on    Subjective   Precautions:  Precautions  Medical Precautions: Fall precautions  Post-Surgical Precautions: Right hip precautions  Vital Signs:       Objective   Pain:  Pain Assessment  Pain Score: 4  Pain Type: Surgical pain  Pain Location: Hip  Pain Orientation: Right  Pain Interventions: Medication (See MAR)  Cognition:  Cognition  Overall Cognitive Status: Within Functional Limits  Postural Control:     Extremity/Trunk Assessments:    Activity Tolerance:     Treatments:  Therapeutic Exercise  Therapeutic Exercise Performed: Yes  Therapeutic Exercise Activity 1: pt performed supine ther ex x15-20 : AP, QS, GS, SAQ, Hip flexion, and Hip ABD . vc/tc req         Bed Mobility  Bed Mobility: Yes  Bed Mobility 1  Bed Mobility 1: Supine to sitting, Sitting to supine  Level of Assistance 1: Close supervision, Contact guard  Bed Mobility Comments 1: assist req to bring RLE over EOB from sitting >  supine    Ambulation/Gait Training  Ambulation/Gait Training Performed: Yes  Ambulation/Gait Training 1  Surface 1: Level tile  Device 1: Rolling walker  Gait Support Devices: Gait belt  Assistance 1: Close supervision  Comments/Distance (ft) 1: 150x2 (pt performed with step through gait pattern. cues for forward gaze/up right posture. VC to stay within RW. slow and steady, no overt LOB noted.)  Transfer 1  Technique 1: Sit to stand, Stand to sit  Transfer Device 1: Walker  Trials/Comments 1: vc/tc for hand placment on solid sitting surface and not RW.    Stairs  Stairs: Yes  Stairs  Comment/Number of Steps 1: pt performed a curb step with use of RW, VC for AD placement and BLE sequence.  CGA.  no overt LOB    Outcome Measures:  [facility] Basic Mobility  Turning from your back to your side while in a flat bed without using bedrails: A little  Moving from lying on your back to sitting on the side of a flat bed without using bedrails: A little  Moving to and from bed to chair (including a wheelchair): A little  Standing up from a chair using your arms (e.g. wheelchair or bedside chair): A little  To walk in hospital room: A little  Climbing 3-5 steps with railing: A little  Basic Mobility - Total Score: 18    Education Documentation  Precautions, taught by Lorne Navarrete PTA at 4/19/2024  3:41 PM.  Learner: Patient  Readiness: Acceptance  Method: Explanation  Response: Verbalizes Understanding    Home Exercise Program, taught by Lorne Navarrete PTA at 4/19/2024  3:41 PM.  Learner: Patient  Readiness: Acceptance  Method: Explanation  Response: Verbalizes Understanding    Mobility Training, taught by Lorne Navarrete PTA at 4/19/2024  3:41 PM.  Learner: Patient  Readiness: Acceptance  Method: Explanation  Response: Verbalizes Understanding    Education Comments  No comments found.        OP EDUCATION:  Outpatient Education  Education Comment: pt stated understanding on how to complete car transfer    Encounter  Problems       Encounter Problems (Resolved)       Balance       Goal 1 (Adequate for Discharge)       Start:  04/18/24    Expected End:  05/02/24    Resolved:  04/19/24    Pt performs all sitting balance with supervision and standing balance with CGA using LRAD            Mobility       STG - Patient will ambulate up and down a curb/step with CGA and LRAD (Adequate for Discharge)       Start:  04/18/24    Expected End:  05/02/24    Resolved:  04/19/24            Mobility       STG - Patient will ambulate (Adequate for Discharge)       Start:  04/18/24    Expected End:  05/02/24    Resolved:  04/19/24    110 ft with CGA using LRAD            PT Problem       PT Goal 1 (Adequate for Discharge)       Start:  04/18/24    Expected End:  05/02/24    Resolved:  04/19/24    Pt demonstrates IND in performing 2 sets of 10 reps of prescribed LE HEP per ANNA protocol            PT Transfers       STG - Patient to transfer to and from sit to supine (Adequate for Discharge)       Start:  04/18/24    Expected End:  05/02/24    Resolved:  04/19/24    With close supervision         STG - Patient will transfer sit to and from stand (Adequate for Discharge)       Start:  04/18/24    Expected End:  05/02/24    Resolved:  04/19/24    With close supervision using LRAD

## 2024-04-19 NOTE — CARE PLAN
The patient's goals for the shift include      The clinical goals for the shift include pain relief through shift      Problem: Pain  Goal: My pain/discomfort is manageable  Outcome: Progressing     Problem: Safety  Goal: Patient will be injury free during hospitalization  Outcome: Progressing  Goal: I will remain free of falls  Outcome: Progressing     Problem: Daily Care  Goal: Daily care needs are met  Outcome: Progressing     Problem: Psychosocial Needs  Goal: Demonstrates ability to cope with hospitalization/illness  Outcome: Progressing  Goal: Collaborate with me, my family, and caregiver to identify my specific goals  Outcome: Progressing     Problem: Discharge Barriers  Goal: My discharge needs are met  Outcome: Progressing

## 2024-04-19 NOTE — DISCHARGE SUMMARY
Discharge Diagnosis  Primary osteoarthritis of right hip    Issues Requiring Follow-Up  S/p right total hip arthroplasty    Test Results Pending At Discharge  Pending Labs       No current pending labs.            Hospital Course   Briefly, the patient is a 68 year-old female with past Hx of osteoarthritis of right hip.  Pt is now  S/p right total hip arthroplasty      HOSPITAL COURSE: The patient underwent right total hip arthroplasty on 4/18/24 which patient tolerated well and remained stable in the postoperative period. On postoperative day #1, patient was tolerating a diet, and remained afebrile with stable vital signs. Patient was ordered oral and intravenous narcotics for pain control.  Patient was judged stable for discharge home on 4/19/24,tolerating diet, afebrile, stable vital signs and lab values, with adequate pain control. The wound was clean and dry. Patient was instructed to follow up in the office within 2 weeks. Pt given prescription for pain, ASA 81 mg BID for DVT prophylaxis and compression stockings. Colace as needed for constipation. Home PT/ OT was arranged prior to discharge.     Pertinent Physical Exam At Time of Discharge  Physical Exam  Cardiovascular:      Pulses: Normal pulses.   Pulmonary:      Effort: Pulmonary effort is normal.   Abdominal:      Palpations: Abdomen is soft.   Musculoskeletal:      Comments: Right Lower Extremity:   -Skin intact with prevena functioning  -Tender at site of injury with painful ROM.  -Fires DF/PF/EHL/FHL  -SILT in saph/sural/SPN/DPN distributions  -Foot warm, well perfused  -Palpable DP pulse, brisk cap refill  -Compartments soft and compressible     Skin:     General: Skin is warm.      Capillary Refill: Capillary refill takes less than 2 seconds.   Neurological:      General: No focal deficit present.      Mental Status: She is alert.   Psychiatric:         Mood and Affect: Mood normal.         Home Medications     Medication List      START taking  these medications     aspirin 81 mg chewable tablet; Chew 1 tablet (81 mg) 2 times a day.   cefadroxil 500 mg capsule; Commonly known as: Duricef; Take 1 capsule   (500 mg) by mouth 2 times a day for 5 days.   docusate sodium 100 mg capsule; Commonly known as: Colace; Take 1   capsule (100 mg) by mouth 2 times a day for 15 days.   ondansetron 4 mg tablet; Commonly known as: Zofran; Take 1 tablet (4 mg)   by mouth every 8 hours if needed for nausea or vomiting.   oxyCODONE 5 mg immediate release tablet; Commonly known as: Roxicodone;   Take 1-2 tablets (5-10 mg) by mouth every 6 hours if needed for severe   pain (7 - 10) for up to 7 days.   Pain Relief ES (acetaminophen) 500 mg tablet; Generic drug:   acetaminophen; Take 2 tablets (1,000 mg) by mouth every 8 hours for 20   days.   pantoprazole 40 mg EC tablet; Commonly known as: ProtoNix; Take 1 tablet   (40 mg) by mouth once daily in the morning. Take before meals. Do not   crush, chew, or split.   senna 8.6 mg tablet; Generic drug: sennosides; Take 1 tablet (8.6 mg) by   mouth once daily for 15 days.   traMADol 50 mg tablet; Commonly known as: Ultram; Take 1-2 tablets   ( mg) by mouth every 6 hours if needed for severe pain (7 - 10) for   up to 7 days.     CONTINUE taking these medications     levothyroxine 200 mcg tablet; Commonly known as: Synthroid, Levoxyl   losartan 50 mg tablet; Commonly known as: Cozaar   multivitamin tablet   Vitamin D3 25 MCG (1000 UT) capsule; Generic drug: cholecalciferol     STOP taking these medications     HYDROcodone-acetaminophen 5-325 mg tablet; Commonly known as: Norco   naproxen 250 mg tablet; Commonly known as: Naprosyn       Outpatient Follow-Up  Future Appointments   Date Time Provider Department [place]   4/22/2024  9:30 AM Jair Dill MD [id number] [facility]   5/30/2024 12:00 AM RAD EXTERNAL FILM [id number] None       Lauren Vinson, APRN-CNP

## 2024-04-20 ENCOUNTER — HOME CARE VISIT (OUTPATIENT)
Dept: HOME HEALTH SERVICES | Facility: HOME HEALTH | Age: 69
End: 2024-04-20
Payer: COMMERCIAL

## 2024-04-20 VITALS — HEART RATE: 80 BPM | SYSTOLIC BLOOD PRESSURE: 112 MMHG | DIASTOLIC BLOOD PRESSURE: 60 MMHG

## 2024-04-20 VITALS
HEART RATE: 80 BPM | OXYGEN SATURATION: 97 % | SYSTOLIC BLOOD PRESSURE: 128 MMHG | RESPIRATION RATE: 16 BRPM | DIASTOLIC BLOOD PRESSURE: 60 MMHG | TEMPERATURE: 98.7 F

## 2024-04-20 PROCEDURE — G0151 HHCP-SERV OF PT,EA 15 MIN: HCPCS

## 2024-04-20 PROCEDURE — G0152 HHCP-SERV OF OT,EA 15 MIN: HCPCS

## 2024-04-20 SDOH — HEALTH STABILITY: PHYSICAL HEALTH: EXERCISE COMMENTS: SUPINE ANKLE PUMPS, QUAD SETS, GLUTEAL SETS, HEEL SLIDES, SAQ 10 REPS

## 2024-04-20 ASSESSMENT — ENCOUNTER SYMPTOMS
PERSON REPORTING PAIN: PATIENT
OCCASIONAL FEELINGS OF UNSTEADINESS: 0
PAIN LOCATION: RIGHT LEG
LOWEST PAIN SEVERITY IN PAST 24 HOURS: 3/10
HIGHEST PAIN SEVERITY IN PAST 24 HOURS: 8/10
APPETITE LEVEL: GOOD
CHANGE IN APPETITE: UNCHANGED
PAIN: 1
BOWEL PATTERN NORMAL: 1
PAIN: 1
PAIN SEVERITY GOAL: 2/10
HIGHEST PAIN SEVERITY IN PAST 24 HOURS: 8/10
PAIN LOCATION: RIGHT HIP
PERSON REPORTING PAIN: PATIENT
SUBJECTIVE PAIN PROGRESSION: GRADUALLY IMPROVING
LOWEST PAIN SEVERITY IN PAST 24 HOURS: 3/10
MUSCLE WEAKNESS: 1
PAIN SEVERITY GOAL: 2/10

## 2024-04-20 ASSESSMENT — ACTIVITIES OF DAILY LIVING (ADL)
TOILETING: 1
FEEDING: INDEPENDENT
AMBULATION ASSISTANCE: STAND BY ASSIST
GROOMING ASSESSED: 1
TOILETING: STAND BY ASSIST
FEEDING ASSESSED: 1
BATHING_CURRENT_FUNCTION: MINIMUM ASSIST
AMBULATION_DISTANCE/DURATION_TOLERATED: 75 X 2
AMBULATION ASSISTANCE: 1
AMBULATION ASSISTANCE ON FLAT SURFACES: 1
CURRENT_FUNCTION: SUPERVISION
PHYSICAL TRANSFERS ASSESSED: 1
BATHING ASSESSED: 1
DRESSING_UB_CURRENT_FUNCTION: INDEPENDENT
DRESSING_LB_CURRENT_FUNCTION: MINIMUM ASSIST
ENTERING_EXITING_HOME: STAND BY ASSIST

## 2024-04-20 NOTE — HOME HEALTH
Patient is an 68 yr old female who had R THR 4-18 at [facility] by Dr Dill. Patient stayed over night and came to [facility] on 4-19-24. PMH HTN, OA, B triple arhtodesis, L THR 2021, L TKR 2022, R TKR 15 yrs ago, total thyroidectomy. PFS Patient lives in 2 story home however has bedroom and full bath on first floor, 1 step into house, ambulating with cane due to pain and without cane indep, indep with ADLs, IADLs, driving and working full-time. Patient is schedule to see Dr Dill on Tuesday 4-23. Patient reports dc papers are incorrect and she contacted [facility]. Patient has a Prevena vac on and contacting nurses regarding if they are taking off at follow up visit. After speaking with our office, home care does not do anything with Prevena since its on for 7 days. PT 1w1, 2w1.

## 2024-04-21 ENCOUNTER — HOME CARE VISIT (OUTPATIENT)
Dept: HOME HEALTH SERVICES | Facility: HOME HEALTH | Age: 69
End: 2024-04-21
Payer: COMMERCIAL

## 2024-04-21 VITALS
TEMPERATURE: 99 F | OXYGEN SATURATION: 96 % | SYSTOLIC BLOOD PRESSURE: 110 MMHG | RESPIRATION RATE: 16 BRPM | HEART RATE: 99 BPM | DIASTOLIC BLOOD PRESSURE: 60 MMHG

## 2024-04-21 PROCEDURE — G0299 HHS/HOSPICE OF RN EA 15 MIN: HCPCS

## 2024-04-21 PROCEDURE — G0151 HHCP-SERV OF PT,EA 15 MIN: HCPCS

## 2024-04-21 SDOH — HEALTH STABILITY: PHYSICAL HEALTH
EXERCISE COMMENTS: SUPINE ANKLE PUMPS, SAQ, QUAD SETS, GLUTEAL SETS, HEEL SLIDES 10 TO 15 REPS, UNABLE TO DO HIP ABDUCTION  SITTING LAQ NEEDS ASSIST  STANDING TOE RAISES, HIP FLEXION THEN NEEDED TO SIT. INSTRUCTED HER IN KNEE FLEXION AND HIP ABDUCTION   ISSUED HEP AND

## 2024-04-21 SDOH — HEALTH STABILITY: PHYSICAL HEALTH: EXERCISE COMMENTS: UPDATED

## 2024-04-21 ASSESSMENT — ENCOUNTER SYMPTOMS
PERSON REPORTING PAIN: PATIENT
PAIN LOCATION: RIGHT HIP
PAIN: 1
HIGHEST PAIN SEVERITY IN PAST 24 HOURS: 6/10
LOWEST PAIN SEVERITY IN PAST 24 HOURS: 3/10
MUSCLE WEAKNESS: 1

## 2024-04-21 ASSESSMENT — ACTIVITIES OF DAILY LIVING (ADL)
AMBULATION ASSISTANCE: SUPERVISION
AMBULATION_DISTANCE/DURATION_TOLERATED: 100 X 2
AMBULATION ASSISTANCE ON FLAT SURFACES: 1
AMBULATION ASSISTANCE: 1

## 2024-04-22 ENCOUNTER — PHARMACY VISIT (OUTPATIENT)
Dept: PHARMACY | Facility: CLINIC | Age: 69
End: 2024-04-22
Payer: COMMERCIAL

## 2024-04-22 ENCOUNTER — APPOINTMENT (OUTPATIENT)
Dept: ORTHOPEDIC SURGERY | Facility: CLINIC | Age: 69
End: 2024-04-22
Payer: COMMERCIAL

## 2024-04-22 ENCOUNTER — HOME CARE VISIT (OUTPATIENT)
Dept: HOME HEALTH SERVICES | Facility: HOME HEALTH | Age: 69
End: 2024-04-22
Payer: COMMERCIAL

## 2024-04-22 VITALS
RESPIRATION RATE: 16 BRPM | SYSTOLIC BLOOD PRESSURE: 118 MMHG | HEART RATE: 68 BPM | TEMPERATURE: 98.6 F | WEIGHT: 225 LBS | DIASTOLIC BLOOD PRESSURE: 68 MMHG | HEIGHT: 65 IN | BODY MASS INDEX: 37.49 KG/M2 | OXYGEN SATURATION: 93 %

## 2024-04-22 DIAGNOSIS — M16.11 PRIMARY OSTEOARTHRITIS OF RIGHT HIP: Primary | ICD-10-CM

## 2024-04-22 PROCEDURE — G0151 HHCP-SERV OF PT,EA 15 MIN: HCPCS

## 2024-04-22 PROCEDURE — RXMED WILLOW AMBULATORY MEDICATION CHARGE

## 2024-04-22 RX ORDER — OXYCODONE HYDROCHLORIDE 5 MG/1
5-10 TABLET ORAL EVERY 6 HOURS PRN
Qty: 40 TABLET | Refills: 0 | Status: SHIPPED | OUTPATIENT
Start: 2024-04-22 | End: 2024-04-29

## 2024-04-22 RX ORDER — TRAMADOL HYDROCHLORIDE 50 MG/1
50-100 TABLET ORAL EVERY 6 HOURS PRN
Qty: 40 TABLET | Refills: 0 | Status: SHIPPED | OUTPATIENT
Start: 2024-04-22 | End: 2024-04-29

## 2024-04-22 ASSESSMENT — ENCOUNTER SYMPTOMS
OCCASIONAL FEELINGS OF UNSTEADINESS: 0
LAST BOWEL MOVEMENT: 66947
HIGHEST PAIN SEVERITY IN PAST 24 HOURS: 6/10
PERSON REPORTING PAIN: PATIENT
SUBJECTIVE PAIN PROGRESSION: UNCHANGED
HIGHEST PAIN SEVERITY IN PAST 24 HOURS: 6/10
PAIN LOCATION: RIGHT HIP
APPETITE LEVEL: GOOD
PAIN: 1
LOWEST PAIN SEVERITY IN PAST 24 HOURS: 3/10
LOSS OF SENSATION IN FEET: 0
PAIN SEVERITY GOAL: 0/10
PERSON REPORTING PAIN: PATIENT
PAIN LOCATION - PAIN SEVERITY: 3/10
DEPRESSION: 0
CHANGE IN APPETITE: UNCHANGED
LOWEST PAIN SEVERITY IN PAST 24 HOURS: 2/10
PAIN: 1

## 2024-04-22 ASSESSMENT — PAIN SCALES - PAIN ASSESSMENT IN ADVANCED DEMENTIA (PAINAD)
CONSOLABILITY: 0
TOTALSCORE: 0
BREATHING: 0
CONSOLABILITY: 0 - NO NEED TO CONSOLE.
FACIALEXPRESSION: 0
NEGVOCALIZATION: 0
FACIALEXPRESSION: 0 - SMILING OR INEXPRESSIVE.
BODYLANGUAGE: 0 - RELAXED.
BODYLANGUAGE: 0
NEGVOCALIZATION: 0 - NONE.

## 2024-04-22 ASSESSMENT — ACTIVITIES OF DAILY LIVING (ADL): AMBULATION_DISTANCE/DURATION_TOLERATED: 300 FEET

## 2024-05-06 ENCOUNTER — TELEPHONE (OUTPATIENT)
Dept: ORTHOPEDIC SURGERY | Facility: HOSPITAL | Age: 69
End: 2024-05-06

## 2024-05-06 NOTE — TELEPHONE ENCOUNTER
Spoke with patient on the phone to follow up after travelling home from [facility]. Patient is doing well following total hip replacement and reports that pain is decreasing. Advised on who to reach out to for pain medication requests and that pain medication may be needed for up to 4-6 weeks. Reports continuing to wear Pankaj Hose and denies issues with constipation. Patient reports walking and doing exercises regularly as instructed. Advised that driving may resume 4-6 weeks after surgery as long as they are off of pain medication. Reminded to get x-rays 6 weeks after surgery and to call [facility] to let us know once complete. Advised to call their nurse navigator during business hours for questions or concerns. For urgent matters or immediate attention, advised to reach out to their PCP or to report to the nearest emergency room. Denies any further questions or concerns at this time.

## 2024-05-30 ENCOUNTER — HOSPITAL ENCOUNTER (OUTPATIENT)
Dept: RADIOLOGY | Facility: EXTERNAL LOCATION | Age: 69
Discharge: HOME | End: 2024-05-30
Payer: COMMERCIAL

## 2024-05-30 DIAGNOSIS — Z96.641 AFTERCARE FOLLOWING RIGHT HIP JOINT REPLACEMENT SURGERY: ICD-10-CM

## 2024-05-30 DIAGNOSIS — Z47.1 AFTERCARE FOLLOWING RIGHT HIP JOINT REPLACEMENT SURGERY: ICD-10-CM

## (undated) DEVICE — Device

## (undated) DEVICE — CAUTERY, PENCIL, PUSH BUTTON, SMOKE EVAC, 70MM

## (undated) DEVICE — SOLUTION, IRRIGATION, USP, SODIUM CHLORIDE 0.9%, 3000 ML, BAG

## (undated) DEVICE — GLOVE, SURGICAL, PROTEXIS PI MICRO, 8.0, PF, LF

## (undated) DEVICE — BLADE, SAW, SAGITTAL, DUAL CUT, 18 X 90 X 1.27

## (undated) DEVICE — GLOVE, SURGICAL, PROTEXIS PI BLUE W/NEUTHERA, 8.0, PF, LF

## (undated) DEVICE — SUTURE, VICRYL, 1, 36 IN, CTX, VIOLET

## (undated) DEVICE — TOWEL, SURGICAL, NEURO, O/R, 16 X 26, BLUE, STERILE

## (undated) DEVICE — STRIP, SKIN CLOSURE, STERI STRIP, REINFORCED, 0.5 X 4 IN

## (undated) DEVICE — INTERPULSE HANDPIECE SET W/ 10FT SUCTION TUBING

## (undated) DEVICE — SUTURE, CTD, VICRYL, 2-0, UND, BR, CT-2

## (undated) DEVICE — MARKER, SKIN, DUAL TIP INK W/9 LABEL AND REMOVABLE TIME OUT SLEEVE

## (undated) DEVICE — SUTURE, ETHIBOND XTRA, 5 V-37, GRN/BR, LF

## (undated) DEVICE — GLOVE, SURGICAL, PROTEXIS PI ORTHO, 8.0, PF, LF

## (undated) DEVICE — NEEDLE, SPINAL, QUINCKE, 18 G X 3.5 IN, PINK HUB

## (undated) DEVICE — BANDAGE, ELASTIC, FLEXMASTER, 6 IN, DBL LENGTH, STERILE

## (undated) DEVICE — SYRINGE, 60 CC, LUER LOCK, MONOJECT, W/O CAP, LF

## (undated) DEVICE — WOUND SYSTEM, DEBRIDEMENT & CLEANING, O.R DUOPAK

## (undated) DEVICE — DRAPE, INCISE, ANTIMICROBIAL, IOBAN 2, LARGE, 17 X 23 IN, DISPOSABLE, STERILE

## (undated) DEVICE — DRAPE, INCISE, ANTIMICROBIAL, IOBAN 2, STERI DRAPE, 23 X 33 IN, DISPOSABLE, STERILE

## (undated) DEVICE — HIGH FLOW TIP FOR INTERPULSE HANDPIECE SET

## (undated) DEVICE — ADHESIVE, SKIN, LIQUIBAND EXCEED

## (undated) DEVICE — SYSTEM KIT, INCISION, PREVENA PEEL AND PLACE, 20CM

## (undated) DEVICE — SUTURE, MONOCRYL, 3-0, 27 IN, PS-2, UNDYED

## (undated) DEVICE — HOOD, SURGICAL, FLYTE SURGICOOL

## (undated) DEVICE — TOWEL PACK, STERILE, 4/PACK, BLUE